# Patient Record
Sex: MALE | Race: WHITE | NOT HISPANIC OR LATINO
[De-identification: names, ages, dates, MRNs, and addresses within clinical notes are randomized per-mention and may not be internally consistent; named-entity substitution may affect disease eponyms.]

---

## 2017-01-09 ENCOUNTER — MEDICATION RENEWAL (OUTPATIENT)
Age: 22
End: 2017-01-09

## 2017-02-02 ENCOUNTER — RX RENEWAL (OUTPATIENT)
Age: 22
End: 2017-02-02

## 2017-02-27 ENCOUNTER — APPOINTMENT (OUTPATIENT)
Dept: NEUROLOGY | Facility: HOSPITAL | Age: 22
End: 2017-02-27

## 2017-03-03 ENCOUNTER — MEDICATION RENEWAL (OUTPATIENT)
Age: 22
End: 2017-03-03

## 2017-03-06 ENCOUNTER — RX RENEWAL (OUTPATIENT)
Age: 22
End: 2017-03-06

## 2017-04-24 ENCOUNTER — MESSAGE (OUTPATIENT)
Age: 22
End: 2017-04-24

## 2017-04-25 ENCOUNTER — RX RENEWAL (OUTPATIENT)
Age: 22
End: 2017-04-25

## 2017-05-01 ENCOUNTER — MEDICATION RENEWAL (OUTPATIENT)
Age: 22
End: 2017-05-01

## 2017-05-08 ENCOUNTER — OUTPATIENT (OUTPATIENT)
Dept: OUTPATIENT SERVICES | Facility: HOSPITAL | Age: 22
LOS: 1 days | End: 2017-05-08

## 2017-05-08 ENCOUNTER — APPOINTMENT (OUTPATIENT)
Dept: NEUROLOGY | Facility: HOSPITAL | Age: 22
End: 2017-05-08

## 2017-05-08 VITALS — HEART RATE: 98 BPM | HEIGHT: 63 IN | SYSTOLIC BLOOD PRESSURE: 120 MMHG | DIASTOLIC BLOOD PRESSURE: 78 MMHG

## 2017-05-08 DIAGNOSIS — G40.909 EPILEPSY, UNSPECIFIED, NOT INTRACTABLE, WITHOUT STATUS EPILEPTICUS: ICD-10-CM

## 2017-07-07 ENCOUNTER — APPOINTMENT (OUTPATIENT)
Dept: PEDIATRIC ADOLESCENT MEDICINE | Facility: HOSPITAL | Age: 22
End: 2017-07-07

## 2017-07-10 ENCOUNTER — APPOINTMENT (OUTPATIENT)
Dept: PEDIATRIC ADOLESCENT MEDICINE | Facility: HOSPITAL | Age: 22
End: 2017-07-10

## 2017-07-19 ENCOUNTER — APPOINTMENT (OUTPATIENT)
Dept: PEDIATRIC ADOLESCENT MEDICINE | Facility: HOSPITAL | Age: 22
End: 2017-07-19

## 2017-07-24 ENCOUNTER — APPOINTMENT (OUTPATIENT)
Dept: PEDIATRIC ADOLESCENT MEDICINE | Facility: HOSPITAL | Age: 22
End: 2017-07-24

## 2017-07-24 VITALS — DIASTOLIC BLOOD PRESSURE: 56 MMHG | HEART RATE: 92 BPM | SYSTOLIC BLOOD PRESSURE: 116 MMHG

## 2017-07-24 DIAGNOSIS — G80.9 CEREBRAL PALSY, UNSPECIFIED: ICD-10-CM

## 2017-08-02 ENCOUNTER — APPOINTMENT (OUTPATIENT)
Dept: PEDIATRIC ADOLESCENT MEDICINE | Facility: HOSPITAL | Age: 22
End: 2017-08-02

## 2017-08-16 ENCOUNTER — RX RENEWAL (OUTPATIENT)
Age: 22
End: 2017-08-16

## 2017-09-11 ENCOUNTER — MEDICATION RENEWAL (OUTPATIENT)
Age: 22
End: 2017-09-11

## 2017-10-18 ENCOUNTER — CHART COPY (OUTPATIENT)
Age: 22
End: 2017-10-18

## 2017-10-31 ENCOUNTER — MEDICATION RENEWAL (OUTPATIENT)
Age: 22
End: 2017-10-31

## 2017-11-03 ENCOUNTER — MEDICATION RENEWAL (OUTPATIENT)
Age: 22
End: 2017-11-03

## 2017-11-13 ENCOUNTER — APPOINTMENT (OUTPATIENT)
Dept: NEUROLOGY | Facility: HOSPITAL | Age: 22
End: 2017-11-13

## 2017-11-13 ENCOUNTER — OUTPATIENT (OUTPATIENT)
Dept: OUTPATIENT SERVICES | Facility: HOSPITAL | Age: 22
LOS: 1 days | End: 2017-11-13

## 2017-11-13 VITALS
HEART RATE: 77 BPM | BODY MASS INDEX: 22.5 KG/M2 | HEIGHT: 63 IN | WEIGHT: 127 LBS | DIASTOLIC BLOOD PRESSURE: 91 MMHG | SYSTOLIC BLOOD PRESSURE: 140 MMHG

## 2017-11-15 DIAGNOSIS — G40.909 EPILEPSY, UNSPECIFIED, NOT INTRACTABLE, WITHOUT STATUS EPILEPTICUS: ICD-10-CM

## 2017-12-15 ENCOUNTER — TRANSCRIPTION ENCOUNTER (OUTPATIENT)
Age: 22
End: 2017-12-15

## 2017-12-15 ENCOUNTER — INPATIENT (INPATIENT)
Facility: HOSPITAL | Age: 22
LOS: 1 days | Discharge: HOME | DRG: 101 | End: 2017-12-17
Attending: PSYCHIATRY & NEUROLOGY | Admitting: PSYCHIATRY & NEUROLOGY
Payer: MEDICARE

## 2017-12-15 VITALS
RESPIRATION RATE: 18 BRPM | HEART RATE: 53 BPM | WEIGHT: 275.58 LBS | SYSTOLIC BLOOD PRESSURE: 109 MMHG | OXYGEN SATURATION: 97 % | HEIGHT: 60.2 IN | TEMPERATURE: 98 F | DIASTOLIC BLOOD PRESSURE: 85 MMHG

## 2017-12-15 DIAGNOSIS — S73.005D UNSPECIFIED DISLOCATION OF LEFT HIP, SUBSEQUENT ENCOUNTER: Chronic | ICD-10-CM

## 2017-12-15 DIAGNOSIS — G40.209 LOCALIZATION-RELATED (FOCAL) (PARTIAL) SYMPTOMATIC EPILEPSY AND EPILEPTIC SYNDROMES WITH COMPLEX PARTIAL SEIZURES, NOT INTRACTABLE, WITHOUT STATUS EPILEPTICUS: ICD-10-CM

## 2017-12-15 DIAGNOSIS — M41.129 ADOLESCENT IDIOPATHIC SCOLIOSIS, SITE UNSPECIFIED: Chronic | ICD-10-CM

## 2017-12-15 LAB
ALBUMIN SERPL ELPH-MCNC: 4.6 G/DL — SIGNIFICANT CHANGE UP (ref 3.3–5)
ALP SERPL-CCNC: 88 U/L — SIGNIFICANT CHANGE UP (ref 40–120)
ALT FLD-CCNC: 19 U/L — SIGNIFICANT CHANGE UP (ref 10–45)
ANION GAP SERPL CALC-SCNC: 13 MMOL/L — SIGNIFICANT CHANGE UP (ref 5–17)
AST SERPL-CCNC: 18 U/L — SIGNIFICANT CHANGE UP (ref 10–40)
BILIRUB SERPL-MCNC: 0.5 MG/DL — SIGNIFICANT CHANGE UP (ref 0.2–1.2)
BUN SERPL-MCNC: 14 MG/DL — SIGNIFICANT CHANGE UP (ref 7–23)
CALCIUM SERPL-MCNC: 9.4 MG/DL — SIGNIFICANT CHANGE UP (ref 8.4–10.5)
CHLORIDE SERPL-SCNC: 99 MMOL/L — SIGNIFICANT CHANGE UP (ref 96–108)
CO2 SERPL-SCNC: 32 MMOL/L — HIGH (ref 22–31)
CREAT SERPL-MCNC: 0.63 MG/DL — SIGNIFICANT CHANGE UP (ref 0.5–1.3)
GLUCOSE SERPL-MCNC: 71 MG/DL — SIGNIFICANT CHANGE UP (ref 70–99)
INR BLD: 1.09 RATIO — SIGNIFICANT CHANGE UP (ref 0.88–1.16)
MAGNESIUM SERPL-MCNC: 2.5 MG/DL — SIGNIFICANT CHANGE UP (ref 1.6–2.6)
POTASSIUM SERPL-MCNC: 4.4 MMOL/L — SIGNIFICANT CHANGE UP (ref 3.5–5.3)
POTASSIUM SERPL-SCNC: 4.4 MMOL/L — SIGNIFICANT CHANGE UP (ref 3.5–5.3)
PROT SERPL-MCNC: 7.9 G/DL — SIGNIFICANT CHANGE UP (ref 6–8.3)
PROTHROM AB SERPL-ACNC: 12.3 SEC — SIGNIFICANT CHANGE UP (ref 10–13.1)
SODIUM SERPL-SCNC: 144 MMOL/L — SIGNIFICANT CHANGE UP (ref 135–145)

## 2017-12-15 PROCEDURE — 95819 EEG AWAKE AND ASLEEP: CPT | Mod: 26

## 2017-12-15 PROCEDURE — 99222 1ST HOSP IP/OBS MODERATE 55: CPT

## 2017-12-15 RX ORDER — ENOXAPARIN SODIUM 100 MG/ML
40 INJECTION SUBCUTANEOUS EVERY 24 HOURS
Qty: 0 | Refills: 0 | Status: DISCONTINUED | OUTPATIENT
Start: 2017-12-15 | End: 2017-12-17

## 2017-12-15 RX ORDER — LEVETIRACETAM 250 MG/1
1 TABLET, FILM COATED ORAL
Qty: 0 | Refills: 0 | COMMUNITY

## 2017-12-15 RX ORDER — LEVETIRACETAM 250 MG/1
500 TABLET, FILM COATED ORAL EVERY 24 HOURS
Qty: 0 | Refills: 0 | Status: DISCONTINUED | OUTPATIENT
Start: 2017-12-15 | End: 2017-12-17

## 2017-12-15 RX ORDER — FAMOTIDINE 10 MG/ML
20 INJECTION INTRAVENOUS
Qty: 0 | Refills: 0 | Status: DISCONTINUED | OUTPATIENT
Start: 2017-12-15 | End: 2017-12-17

## 2017-12-15 RX ORDER — LAMOTRIGINE 25 MG/1
200 TABLET, ORALLY DISINTEGRATING ORAL
Qty: 0 | Refills: 0 | Status: DISCONTINUED | OUTPATIENT
Start: 2017-12-15 | End: 2017-12-17

## 2017-12-15 RX ORDER — LEVETIRACETAM 250 MG/1
1000 TABLET, FILM COATED ORAL
Qty: 0 | Refills: 0 | Status: DISCONTINUED | OUTPATIENT
Start: 2017-12-15 | End: 2017-12-17

## 2017-12-15 RX ADMIN — ENOXAPARIN SODIUM 40 MILLIGRAM(S): 100 INJECTION SUBCUTANEOUS at 20:39

## 2017-12-15 RX ADMIN — LEVETIRACETAM 500 MILLIGRAM(S): 250 TABLET, FILM COATED ORAL at 17:34

## 2017-12-15 RX ADMIN — FAMOTIDINE 20 MILLIGRAM(S): 10 INJECTION INTRAVENOUS at 20:40

## 2017-12-15 RX ADMIN — LEVETIRACETAM 1000 MILLIGRAM(S): 250 TABLET, FILM COATED ORAL at 17:33

## 2017-12-15 RX ADMIN — LAMOTRIGINE 200 MILLIGRAM(S): 25 TABLET, ORALLY DISINTEGRATING ORAL at 17:30

## 2017-12-15 NOTE — DISCHARGE NOTE ADULT - PATIENT PORTAL LINK FT
“You can access the [facility] Patient Portal, offered by [facility], by registering with the following website: [email or web address]”

## 2017-12-15 NOTE — H&P ADULT - MENTAL STATUS
General: Sitting in wheelchair in no acute distress  Neuro: MS: Awake and alert, non-verbal, not following simple commands

## 2017-12-15 NOTE — H&P ADULT - MOTOR
Motor: Increased tone throughout. b/l UE's antigravity, internally rotated/contracted. does not move LE  Reflexes: 3+ DTR's throughout

## 2017-12-15 NOTE — DISCHARGE NOTE ADULT - MEDICATION SUMMARY - MEDICATIONS TO TAKE
I will START or STAY ON the medications listed below when I get home from the hospital:    LaMICtal 200 mg oral tablet  -- 1 tab(s) by mouth 2 times a day  -- Indication: For Seizure disorder    Keppra 1000 mg oral tablet  -- 1 tab(s) by mouth in AM and   one and half tablet in evening   -- Indication: For Seizure disorder    raNITIdine 75 mg oral tablet  -- take 1/2 tab in the morning and 1 1/2 tablets in the evening.  -- Indication: For GERD

## 2017-12-15 NOTE — DISCHARGE NOTE ADULT - CARE PLAN
Principal Discharge DX:	Seizure disorder  Goal:	follow with   Instructions for follow-up, activity and diet:	take prescribe medications as advised Principal Discharge DX:	Seizure disorder  Goal:	follow with Dr. Moreno  Instructions for follow-up, activity and diet:	take prescribe medications as advised Principal Discharge DX:	Seizure disorder  Goal:	follow with Dr. Moreno  Assessment and plan of treatment:	take prescribe medications as advised

## 2017-12-15 NOTE — DISCHARGE NOTE ADULT - NS AS DC STROKE ED MATERIALS
Call 911 for Stroke/Prescribed Medications/Stroke Warning Signs and Symptoms/Need for Followup After Discharge/Risk Factors for Stroke/Stroke Education Booklet

## 2017-12-15 NOTE — H&P ADULT - ASSESSMENT
19 yo M with Fragile X syndrome, MR, and epilepsy has twitching episodes every 6- 8 months, lest episode 4 months ago. On Keppra 1000/1500mg and Lamictal 200BID.        -Patient to have EMU admission to monitor for subclinical seizures  -Continue meds as is lamictal 200mg BID and Keppra 1000/1500mg  - regular diet   - DVT prophylaxis   - VEEG

## 2017-12-15 NOTE — H&P ADULT - HISTORY OF PRESENT ILLNESS
Pt is a 21 yo M with hx of Fragile X syndrome, MR and seizures since 3 years old. He has been on Keppra and Lamictal since about 8 years ago with great control of the seizures currently about once a year. He is currently on (generic medications) Keppra 1000 mg tab 2 po AM and 1.5 in the PM and Lamictal 200 mg tab, 1 po BID. The seizures typically consist of eyes rolled up and had shaking with generalized body shaking and stiffening. The events typically last about 1 minute and they give him rectal Diastat which stops the seizures promptly.     Mother reports that patient has twitching episodes once every 6-8 months. Last episode was about 4 months ago where he had facial twitching for a few minutes and was given diastat, no seizures after that. They would like an EMU admission to see if he is having subclinical seizures. No adverse effects from the medication regimen. Continues to take Keppra 1000/1500mg and Lamictal 200mg BID. Pt is a 21 yo M with hx of Fragile X syndrome, MR and seizures since 3 years old. He has been on Keppra and Lamictal since about 8 years ago with great control of the seizures currently about once a year. He is currently on (generic medications) Keppra 1000 mg tab 2 po AM and 1.5 in the PM and Lamictal 200 mg tab, 1 po BID. The seizures typically consist of eyes rolled up and had shaking with generalized body shaking and stiffening. The events typically last about 1 minute and they give him rectal Diastat which stops the seizures promptly.     Mother reports that patient has twitching episodes once every 6-8 months. Last GTC 8 months ago. Facial twitching every 5 weeks or so, unclear if other seizure type.  No adverse effects from the medication regimen. Continues to take Keppra 1000/1500mg and Lamictal 200mg BID.

## 2017-12-15 NOTE — H&P ADULT - ATTENDING COMMENTS
I have examined the pt at bedside and discussed the plan with patient's father at bedside, resident.  The risks and the benefits of the proposed diagnostic/therapeutic approach were thoroughly discussed.   I agree with the above plan and have modified it where necessary.

## 2017-12-15 NOTE — DISCHARGE NOTE ADULT - HOSPITAL COURSE
Pt is a 21 yo M with hx of Fragile X syndrome, MR and seizures since 3 years old. He has been on Keppra and Lamictal since about 8 years ago with great control of the seizures currently about once a year. He is currently on (generic medications) Keppra 1000 mg tab 2 po AM and 1.5 in the PM and Lamictal 200 mg tab, 1 po BID. The seizures typically consist of eyes rolled up and had shaking with generalized body shaking and stiffening. The events typically last about 1 minute and they give him rectal Diastat which stops the seizures promptly.     Mother reports that patient has twitching episodes once every 6-8 months. Last GTC 8 months ago. Facial twitching every 5 weeks or so, unclear if other seizure type.  No adverse effects from the medication regimen. Continues to take Keppra 1000/1500mg and Lamictal 200mg BID. Pt is a 21 yo M with hx of Fragile X syndrome, MR and seizures since 3 years old. He has been on Keppra and Lamictal since about 8 years ago with great control of the seizures currently about once a year. He is currently on (generic medications) Keppra 1000 mg tab 2 po AM and 1.5 in the PM and Lamictal 200 mg tab, 1 po BID. The seizures typically consist of eyes rolled up and had shaking with generalized body shaking and stiffening. The events typically last about 1 minute and they give him rectal Diastat which stops the seizures promptly.     Mother reports that patient has twitching episodes once every 6-8 months. Last GTC 8 months ago. Facial twitching every 5 weeks or so, unclear if other seizure type.  No adverse effects from the medication regimen. Continues to take Keppra 1000/1500mg and Lamictal 200mg BID.    Patient admitted to EMU for monitoring. No events on inpatient 48 hour VEEG. Patient cleared for discharge will continue on home AEDs Pt is a 21 yo M with hx of Fragile X syndrome, MR and seizures since 3 years old. He has been on Keppra and Lamictal since about 8 years ago with great control of the seizures currently about once a year. He is currently on (generic medications) Keppra 1000 mg tab 2 po AM and 1.5 in the PM and Lamictal 200 mg tab, 1 po BID. The seizures typically consist of eyes rolled up and had shaking with generalized body shaking and stiffening. The events typically last about 1 minute and they give him rectal Diastat which stops the seizures promptly.     Mother reports that patient has twitching episodes once every 6-8 months. Last GTC 8 months ago. Facial twitching every 5 weeks or so, unclear if other seizure type.  No adverse effects from the medication regimen. Continues to take Keppra 1000/1500mg and Lamictal 200mg BID.    Patient admitted to EMU for monitoring. No events on inpatient 48 hour VEEG.  Plan discussed with Dr. Trevizo on day of discharge.  -Patient to be discharged with Follow up with Dr. Moreno  -Continue home meds Lamictal 200mg BID and Keppra 1000/1500mg upon DC.  -Patient cleared for discharge will continue on home AEDs.

## 2017-12-15 NOTE — DISCHARGE NOTE ADULT - CARE PROVIDER_API CALL
Skip Moreno (MD), Clinical Neurophysiology; EEGEpilepsy; Neurology  [address] [address]  Phone: [phone]  Fax: [phone]

## 2017-12-16 PROCEDURE — 95951: CPT | Mod: 26

## 2017-12-16 PROCEDURE — 99232 SBSQ HOSP IP/OBS MODERATE 35: CPT

## 2017-12-16 RX ADMIN — LEVETIRACETAM 500 MILLIGRAM(S): 250 TABLET, FILM COATED ORAL at 17:36

## 2017-12-16 RX ADMIN — ENOXAPARIN SODIUM 40 MILLIGRAM(S): 100 INJECTION SUBCUTANEOUS at 20:20

## 2017-12-16 RX ADMIN — LAMOTRIGINE 200 MILLIGRAM(S): 25 TABLET, ORALLY DISINTEGRATING ORAL at 05:44

## 2017-12-16 RX ADMIN — FAMOTIDINE 20 MILLIGRAM(S): 10 INJECTION INTRAVENOUS at 10:26

## 2017-12-16 RX ADMIN — FAMOTIDINE 20 MILLIGRAM(S): 10 INJECTION INTRAVENOUS at 17:34

## 2017-12-16 RX ADMIN — LEVETIRACETAM 1000 MILLIGRAM(S): 250 TABLET, FILM COATED ORAL at 05:44

## 2017-12-16 RX ADMIN — LAMOTRIGINE 200 MILLIGRAM(S): 25 TABLET, ORALLY DISINTEGRATING ORAL at 17:34

## 2017-12-16 NOTE — PROGRESS NOTE ADULT - ASSESSMENT
19 yo M with Fragile X syndrome, MR, and epilepsy has twitching episodes every 6- 8 months, lest episode 4 months ago. On Keppra 1000/1500mg and Lamictal 200BID. No events capture on EEG overnight.         -Patient to have EMU admission to monitor for subclinical seizures  -Continue meds as is lamictal 200mg BID and Keppra 1000/1500mg  - regular diet   - DVT prophylaxis   - continuous VEEG   - seizure and fall precautions.  - Ativan 1mg ordered PRN for seizure >3 minutes.       - If no events overnight , plan for discharge tomorrow 21 yo M with Fragile X syndrome, MR, and epilepsy has twitching episodes every 6- 8 months, lest episode 4 months ago. On Keppra 1000/1500mg and Lamictal 200BID. No events capture on EEG overnight.     -Patient to have EMU admission to monitor for subclinical seizures  -Continue meds as is lamictal 200mg BID and Keppra 1000/1500mg  - regular diet   - DVT prophylaxis   - continuous VEEG   - seizure and fall precautions.  - Ativan 1mg ordered PRN for seizure >3 minutes.       - If no events overnight , plan for discharge tomorrow

## 2017-12-16 NOTE — PROGRESS NOTE ADULT - SUBJECTIVE AND OBJECTIVE BOX
Neurology Follow up note    Patient is a 22y old  Male who presents with a chief complaint of facial twitching (15 Dec 2017 16:16)      Subjective:Interval History - No events overnight    CONSTITUTIONAL:   HEENT:  No visual loss, blurred vision, double vision.  No hearing loss, sneezing, congestion, runny nose or sore throat.  SKIN:  No rash or itching.  CARDIOVASCULAR:  No chest pain, chest pressure or chest discomfort. No palpitations or edema.  RESPIRATORY:  No shortness of breath, cough or sputum.  GASTROINTESTINAL:  No anorexia, nausea, vomiting or diarrhea. No abdominal pain.  GENITOURINARY:  NO dysuria. No increased frequency. No retention. No incontinence.  NEUROLOGICAL:  See HPI  MUSCULOSKELETAL:  No muscle, back pain, joint pain or stiffness.  HEMATOLOGIC:  No anemia, bleeding or bruising.    PAST MEDICAL & SURGICAL HISTORY:  Seizure disorder  Fragile X syndrome  Cerebral palsy  Bilateral hip dislocation, subsequent encounter  Adolescent idiopathic scoliosis      Objective:   Vital Signs Last 24 Hrs  T(C): 36.5 (16 Dec 2017 12:31), Max: 37.1 (15 Dec 2017 20:01)  T(F): 97.7 (16 Dec 2017 12:31), Max: 98.7 (15 Dec 2017 20:01)  HR: 74 (16 Dec 2017 12:31) (61 - 74)  BP: 97/67 (16 Dec 2017 12:31) (84/52 - 111/64)  BP(mean): --  RR: 18 (16 Dec 2017 12:31) (18 - 18)  SpO2: 98% (16 Dec 2017 12:31) (96% - 100%)    General Exam:   General appearance: No acute distress                   Neurological exam:  MS: Awake alert, nonverbal at baseline, not following commands  CN: PERRL, EOMI, no facial asymmetry, tongue midline  Motor: increased tone throughout, no movement LEs, contracted.  Sensory: Intact to LY throughout  Reflexes: hyperreflexic throughout      Other:    12-15    144  |  99  |  14  ----------------------------<  71  4.4   |  32<H>  |  0.63    Ca    9.4      15 Dec 2017 16:47  Mg     2.5     12-15    TPro  7.9  /  Alb  4.6  /  TBili  0.5  /  DBili  x   /  AST  18  /  ALT  19  /  AlkPhos  88  12-15    12-15    144  |  99  |  14  ----------------------------<  71  4.4   |  32<H>  |  0.63    Ca    9.4      15 Dec 2017 16:47  Mg     2.5     12-15    TPro  7.9  /  Alb  4.6  /  TBili  0.5  /  DBili  x   /  AST  18  /  ALT  19  /  AlkPhos  88  12-15    LIVER FUNCTIONS - ( 15 Dec 2017 16:47 )  Alb: 4.6 g/dL / Pro: 7.9 g/dL / ALK PHOS: 88 U/L / ALT: 19 U/L / AST: 18 U/L / GGT: x            MEDICATIONS  (STANDING):  enoxaparin Injectable 40 milliGRAM(s) SubCutaneous every 24 hours  famotidine    Tablet 20 milliGRAM(s) Oral two times a day  lamoTRIgine 200 milliGRAM(s) Oral two times a day  levETIRAcetam 500 milliGRAM(s) Oral every 24 hours  levETIRAcetam 1000 milliGRAM(s) Oral two times a day  LORazepam   Injectable 1 milliGRAM(s) IV Push once    MEDICATIONS  (PRN):  LORazepam   Injectable 2 milliGRAM(s) IV Push once PRN Seizure activity Neurology Follow up note    Patient is a 22y old  Male who presents with a chief complaint of facial twitching (15 Dec 2017 16:16)    Subjective:Interval History - No events overnight    ROS:  HEENT:  No visual loss, blurred vision, double vision.  No hearing loss, sneezing, congestion, runny nose or sore throat.  SKIN:  No rash or itching.  CARDIOVASCULAR:  No chest pain, chest pressure or chest discomfort. No palpitations or edema.  RESPIRATORY:  No shortness of breath, cough or sputum.  GASTROINTESTINAL:  No anorexia, nausea, vomiting or diarrhea. No abdominal pain.  GENITOURINARY:  NO dysuria. No increased frequency. No retention. No incontinence.  NEUROLOGICAL:  See HPI  MUSCULOSKELETAL:  No muscle, back pain, joint pain or stiffness.  HEMATOLOGIC:  No anemia, bleeding or bruising.    PAST MEDICAL & SURGICAL HISTORY:  Seizure disorder  Fragile X syndrome  Cerebral palsy  Bilateral hip dislocation, subsequent encounter  Adolescent idiopathic scoliosis    Objective:   Vital Signs Last 24 Hrs  T(C): 36.5 (16 Dec 2017 12:31), Max: 37.1 (15 Dec 2017 20:01)  T(F): 97.7 (16 Dec 2017 12:31), Max: 98.7 (15 Dec 2017 20:01)  HR: 74 (16 Dec 2017 12:31) (61 - 74)  BP: 97/67 (16 Dec 2017 12:31) (84/52 - 111/64)  BP(mean): --  RR: 18 (16 Dec 2017 12:31) (18 - 18)  SpO2: 98% (16 Dec 2017 12:31) (96% - 100%)    General Exam:   General appearance: No acute distress                   Neurological exam:  MS: Awake alert, nonverbal at baseline, not following commands  CN: PERRL, EOMI, no facial asymmetry, tongue midline  Motor: increased tone throughout, no movement LEs, contracted.  Sensory: Intact to LT throughout  Reflexes: hyperreflexic throughout    Other:    12-15    144  |  99  |  14  ----------------------------<  71  4.4   |  32<H>  |  0.63    Ca    9.4      15 Dec 2017 16:47  Mg     2.5     12-15    TPro  7.9  /  Alb  4.6  /  TBili  0.5  /  DBili  x   /  AST  18  /  ALT  19  /  AlkPhos  88  12-15    12-15    144  |  99  |  14  ----------------------------<  71  4.4   |  32<H>  |  0.63    Ca    9.4      15 Dec 2017 16:47  Mg     2.5     12-15    TPro  7.9  /  Alb  4.6  /  TBili  0.5  /  DBili  x   /  AST  18  /  ALT  19  /  AlkPhos  88  12-15    LIVER FUNCTIONS - ( 15 Dec 2017 16:47 )  Alb: 4.6 g/dL / Pro: 7.9 g/dL / ALK PHOS: 88 U/L / ALT: 19 U/L / AST: 18 U/L / GGT: x          MEDICATIONS  (STANDING):  enoxaparin Injectable 40 milliGRAM(s) SubCutaneous every 24 hours  famotidine    Tablet 20 milliGRAM(s) Oral two times a day  lamoTRIgine 200 milliGRAM(s) Oral two times a day  levETIRAcetam 500 milliGRAM(s) Oral every 24 hours  levETIRAcetam 1000 milliGRAM(s) Oral two times a day  LORazepam   Injectable 1 milliGRAM(s) IV Push once    MEDICATIONS  (PRN):  LORazepam   Injectable 2 milliGRAM(s) IV Push once PRN Seizure activity

## 2017-12-17 VITALS
TEMPERATURE: 98 F | OXYGEN SATURATION: 100 % | SYSTOLIC BLOOD PRESSURE: 102 MMHG | HEART RATE: 77 BPM | RESPIRATION RATE: 18 BRPM | DIASTOLIC BLOOD PRESSURE: 75 MMHG

## 2017-12-17 LAB
HCT VFR BLD CALC: 44.8 % — SIGNIFICANT CHANGE UP (ref 39–50)
HGB BLD-MCNC: 15 G/DL — SIGNIFICANT CHANGE UP (ref 13–17)
LAMOTRIGINE SERPL-MCNC: 16.8 MCG/ML — SIGNIFICANT CHANGE UP (ref 2.5–15)
MCHC RBC-ENTMCNC: 32.6 PG — SIGNIFICANT CHANGE UP (ref 27–34)
MCHC RBC-ENTMCNC: 33.5 GM/DL — SIGNIFICANT CHANGE UP (ref 32–36)
MCV RBC AUTO: 97.4 FL — SIGNIFICANT CHANGE UP (ref 80–100)
PLATELET # BLD AUTO: 188 K/UL — SIGNIFICANT CHANGE UP (ref 150–400)
RBC # BLD: 4.6 M/UL — SIGNIFICANT CHANGE UP (ref 4.2–5.8)
RBC # FLD: 12.5 % — SIGNIFICANT CHANGE UP (ref 10.3–14.5)
WBC # BLD: 8.47 K/UL — SIGNIFICANT CHANGE UP (ref 3.8–10.5)
WBC # FLD AUTO: 8.47 K/UL — SIGNIFICANT CHANGE UP (ref 3.8–10.5)

## 2017-12-17 PROCEDURE — 85027 COMPLETE CBC AUTOMATED: CPT

## 2017-12-17 PROCEDURE — 85610 PROTHROMBIN TIME: CPT

## 2017-12-17 PROCEDURE — 80175 DRUG SCREEN QUAN LAMOTRIGINE: CPT

## 2017-12-17 PROCEDURE — 99232 SBSQ HOSP IP/OBS MODERATE 35: CPT

## 2017-12-17 PROCEDURE — 83735 ASSAY OF MAGNESIUM: CPT

## 2017-12-17 PROCEDURE — 95951: CPT

## 2017-12-17 PROCEDURE — 95819 EEG AWAKE AND ASLEEP: CPT

## 2017-12-17 PROCEDURE — 80053 COMPREHEN METABOLIC PANEL: CPT

## 2017-12-17 PROCEDURE — 95951: CPT | Mod: 26

## 2017-12-17 PROCEDURE — 90686 IIV4 VACC NO PRSV 0.5 ML IM: CPT

## 2017-12-17 RX ORDER — INFLUENZA VIRUS VACCINE 15; 15; 15; 15 UG/.5ML; UG/.5ML; UG/.5ML; UG/.5ML
0.5 SUSPENSION INTRAMUSCULAR ONCE
Qty: 0 | Refills: 0 | Status: COMPLETED | OUTPATIENT
Start: 2017-12-17 | End: 2017-12-17

## 2017-12-17 RX ADMIN — FAMOTIDINE 20 MILLIGRAM(S): 10 INJECTION INTRAVENOUS at 07:34

## 2017-12-17 RX ADMIN — INFLUENZA VIRUS VACCINE 0.5 MILLILITER(S): 15; 15; 15; 15 SUSPENSION INTRAMUSCULAR at 12:38

## 2017-12-17 RX ADMIN — LAMOTRIGINE 200 MILLIGRAM(S): 25 TABLET, ORALLY DISINTEGRATING ORAL at 07:34

## 2017-12-17 RX ADMIN — LEVETIRACETAM 1000 MILLIGRAM(S): 250 TABLET, FILM COATED ORAL at 07:34

## 2017-12-17 NOTE — PROGRESS NOTE ADULT - SUBJECTIVE AND OBJECTIVE BOX
Neurology Follow up note    Patient is a 22y old  Male who presents with a chief complaint of facial twitching (15 Dec 2017 16:16)    Subjective:Interval History - No events overnight EEG negative    ROS:  HEENT:  No visual loss, blurred vision, double vision.  No hearing loss, sneezing, congestion, runny nose or sore throat.  SKIN:  No rash or itching.  CARDIOVASCULAR:  No chest pain, chest pressure or chest discomfort. No palpitations or edema.  RESPIRATORY:  No shortness of breath, cough or sputum.  GASTROINTESTINAL:  No anorexia, nausea, vomiting or diarrhea. No abdominal pain.  GENITOURINARY:  NO dysuria. No increased frequency. No retention. No incontinence.  NEUROLOGICAL:  See HPI  MUSCULOSKELETAL:  No muscle, back pain, joint pain or stiffness.  HEMATOLOGIC:  No anemia, bleeding or bruising.    PAST MEDICAL & SURGICAL HISTORY:  Seizure disorder  Fragile X syndrome  Cerebral palsy  Bilateral hip dislocation, subsequent encounter  Adolescent idiopathic scoliosis    Objective:   Vital Signs Last 24 Hrs  T(C): 36.4 (17 Dec 2017 08:14), Max: 36.6 (16 Dec 2017 15:31)  T(F): 97.5 (17 Dec 2017 08:14), Max: 97.8 (16 Dec 2017 15:31)  HR: 82 (17 Dec 2017 08:14) (60 - 82)  BP: 104/67 (17 Dec 2017 08:14) (95/61 - 109/77)  BP(mean): --  RR: 18 (17 Dec 2017 08:14) (16 - 19)  SpO2: 99% (17 Dec 2017 08:14) (97% - 100%)  General Exam:   General appearance: No acute distress                   Neurological exam:  MS: Awake alert, nonverbal at baseline, not following commands  CN: PERRL, EOMI, no facial asymmetry, tongue midline  Motor: increased tone throughout, no movement LEs, contracted.  Sensory: Intact to LT throughout  Reflexes: hyperreflexic throughout    Other:    12-15    144  |  99  |  14  ----------------------------<  71  4.4   |  32<H>  |  0.63    Ca    9.4      15 Dec 2017 16:47  Mg     2.5     12-15    TPro  7.9  /  Alb  4.6  /  TBili  0.5  /  DBili  x   /  AST  18  /  ALT  19  /  AlkPhos  88  12-15    12-15    144  |  99  |  14  ----------------------------<  71  4.4   |  32<H>  |  0.63    Ca    9.4      15 Dec 2017 16:47  Mg     2.5     12-15    TPro  7.9  /  Alb  4.6  /  TBili  0.5  /  DBili  x   /  AST  18  /  ALT  19  /  AlkPhos  88  12-15    LIVER FUNCTIONS - ( 15 Dec 2017 16:47 )  Alb: 4.6 g/dL / Pro: 7.9 g/dL / ALK PHOS: 88 U/L / ALT: 19 U/L / AST: 18 U/L / GGT: x          MEDICATIONS  (STANDING):  enoxaparin Injectable 40 milliGRAM(s) SubCutaneous every 24 hours  famotidine    Tablet 20 milliGRAM(s) Oral two times a day  lamoTRIgine 200 milliGRAM(s) Oral two times a day  levETIRAcetam 500 milliGRAM(s) Oral every 24 hours  levETIRAcetam 1000 milliGRAM(s) Oral two times a day  LORazepam   Injectable 1 milliGRAM(s) IV Push once    MEDICATIONS  (PRN):  LORazepam   Injectable 2 milliGRAM(s) IV Push once PRN Seizure activity Neurology Follow up note    Patient is a 22y old  Male who presents with a chief complaint of facial twitching (15 Dec 2017 16:16).    Subjective: Interval History - No events overnight EEG negative    ROS:  HEENT:  No visual loss, blurred vision, double vision.  No hearing loss, sneezing, congestion, runny nose or sore throat.  SKIN:  No rash or itching.  CARDIOVASCULAR:  No chest pain, chest pressure or chest discomfort. No palpitations or edema.  RESPIRATORY:  No shortness of breath, cough or sputum.  GASTROINTESTINAL:  No anorexia, nausea, vomiting or diarrhea. No abdominal pain.  GENITOURINARY:  NO dysuria. No increased frequency. No retention. No incontinence.  NEUROLOGICAL:  See HPI  MUSCULOSKELETAL:  No muscle, back pain, joint pain or stiffness.  HEMATOLOGIC:  No anemia, bleeding or bruising.    PAST MEDICAL & SURGICAL HISTORY:  Seizure disorder  Fragile X syndrome  Cerebral palsy  Bilateral hip dislocation, subsequent encounter  Adolescent idiopathic scoliosis    Allergies  No Known Allergies    Social Hx: lives with family, no toxic habits.     Objective:   Vital Signs Last 24 Hrs  T(C): 36.4 (17 Dec 2017 08:14), Max: 36.6 (16 Dec 2017 15:31)  T(F): 97.5 (17 Dec 2017 08:14), Max: 97.8 (16 Dec 2017 15:31)  HR: 82 (17 Dec 2017 08:14) (60 - 82)  BP: 104/67 (17 Dec 2017 08:14) (95/61 - 109/77)  BP(mean): --  RR: 18 (17 Dec 2017 08:14) (16 - 19)  SpO2: 99% (17 Dec 2017 08:14) (97% - 100%)    General Exam:   General appearance: No acute distress  Skin: no rashes.   CVS: S1, S2, no MRG.   Lungs: CTA.                   Neurological exam:  MS: Awake alert, nonverbal at baseline, not following commands  CN: PERRL, EOMI, no facial asymmetry, tongue midline  Motor: increased tone throughout, no movement LEs, contracted.  Sensory: Intact to LT throughout  Reflexes: hyperreflexic throughout    Other:    12-15    144  |  99  |  14  ----------------------------<  71  4.4   |  32<H>  |  0.63    Ca    9.4      15 Dec 2017 16:47  Mg     2.5     12-15    TPro  7.9  /  Alb  4.6  /  TBili  0.5  /  DBili  x   /  AST  18  /  ALT  19  /  AlkPhos  88  12-15    12-15    144  |  99  |  14  ----------------------------<  71  4.4   |  32<H>  |  0.63    Ca    9.4      15 Dec 2017 16:47  Mg     2.5     12-15    TPro  7.9  /  Alb  4.6  /  TBili  0.5  /  DBili  x   /  AST  18  /  ALT  19  /  AlkPhos  88  12-15    LIVER FUNCTIONS - ( 15 Dec 2017 16:47 )  Alb: 4.6 g/dL / Pro: 7.9 g/dL / ALK PHOS: 88 U/L / ALT: 19 U/L / AST: 18 U/L / GGT: x          MEDICATIONS  (STANDING):  enoxaparin Injectable 40 milliGRAM(s) SubCutaneous every 24 hours  famotidine    Tablet 20 milliGRAM(s) Oral two times a day  lamoTRIgine 200 milliGRAM(s) Oral two times a day  levETIRAcetam 500 milliGRAM(s) Oral every 24 hours  levETIRAcetam 1000 milliGRAM(s) Oral two times a day  LORazepam   Injectable 1 milliGRAM(s) IV Push once    MEDICATIONS  (PRN):  LORazepam   Injectable 2 milliGRAM(s) IV Push once PRN Seizure activity

## 2017-12-17 NOTE — PROGRESS NOTE ADULT - ATTENDING COMMENTS
I have examined the pt at bedside and discussed the plan with neuro resident and Dr. Trevizo.  The risks and the benefits of the proposed diagnostic/therapeutic approach were thoroughly discussed.   I agree with the above plan and have modified it where necessary.

## 2017-12-17 NOTE — PROGRESS NOTE ADULT - ASSESSMENT
21 yo M with Fragile X syndrome, MR, and epilepsy has twitching episodes every 6- 8 months, lest episode 4 months ago. On Keppra 1000/1500mg and Lamictal 200BID. No events capture on EEG past 48 hours    -Patient to be discharged with Follow up with   -Continue home  meds lamictal 200mg BID and Keppra 1000/1500mg upon DC 19 yo M with Fragile X syndrome, MR, and epilepsy has twitching episodes every 6- 8 months, lest episode 4 months ago. On Keppra 1000/1500mg and Lamictal 200BID. No events capture on EEG past 48 hours    -Patient to be discharged with Follow up with Dr. Moreno  -Continue home meds lamictal 200mg BID and Keppra 1000/1500mg upon DC

## 2018-01-18 ENCOUNTER — RX RENEWAL (OUTPATIENT)
Age: 23
End: 2018-01-18

## 2018-03-13 ENCOUNTER — MEDICATION RENEWAL (OUTPATIENT)
Age: 23
End: 2018-03-13

## 2018-04-03 ENCOUNTER — MEDICATION RENEWAL (OUTPATIENT)
Age: 23
End: 2018-04-03

## 2018-05-14 ENCOUNTER — APPOINTMENT (OUTPATIENT)
Dept: NEUROLOGY | Facility: HOSPITAL | Age: 23
End: 2018-05-14

## 2018-06-01 ENCOUNTER — INPATIENT (INPATIENT)
Facility: HOSPITAL | Age: 23
LOS: 3 days | Discharge: HOME HEALTH | End: 2018-06-05
Attending: HOSPITALIST | Admitting: HOSPITALIST
Payer: MEDICARE

## 2018-06-01 VITALS
HEART RATE: 108 BPM | RESPIRATION RATE: 16 BRPM | DIASTOLIC BLOOD PRESSURE: 83 MMHG | TEMPERATURE: 99 F | SYSTOLIC BLOOD PRESSURE: 115 MMHG | OXYGEN SATURATION: 98 %

## 2018-06-01 DIAGNOSIS — S73.005D UNSPECIFIED DISLOCATION OF LEFT HIP, SUBSEQUENT ENCOUNTER: Chronic | ICD-10-CM

## 2018-06-01 DIAGNOSIS — M41.129 ADOLESCENT IDIOPATHIC SCOLIOSIS, SITE UNSPECIFIED: Chronic | ICD-10-CM

## 2018-06-01 LAB
ALBUMIN SERPL ELPH-MCNC: 4.8 G/DL — SIGNIFICANT CHANGE UP (ref 3.3–5)
ALP SERPL-CCNC: 76 U/L — SIGNIFICANT CHANGE UP (ref 40–120)
ALT FLD-CCNC: 13 U/L — SIGNIFICANT CHANGE UP (ref 4–41)
AST SERPL-CCNC: 14 U/L — SIGNIFICANT CHANGE UP (ref 4–40)
BASE EXCESS BLDV CALC-SCNC: 8.8 MMOL/L — SIGNIFICANT CHANGE UP
BASOPHILS # BLD AUTO: 0.04 K/UL — SIGNIFICANT CHANGE UP (ref 0–0.2)
BASOPHILS NFR BLD AUTO: 0.5 % — SIGNIFICANT CHANGE UP (ref 0–2)
BILIRUB SERPL-MCNC: 0.2 MG/DL — SIGNIFICANT CHANGE UP (ref 0.2–1.2)
BLOOD GAS VENOUS - CREATININE: 0.53 MG/DL — SIGNIFICANT CHANGE UP (ref 0.5–1.3)
BUN SERPL-MCNC: 13 MG/DL — SIGNIFICANT CHANGE UP (ref 7–23)
CALCIUM SERPL-MCNC: 9.2 MG/DL — SIGNIFICANT CHANGE UP (ref 8.4–10.5)
CHLORIDE BLDV-SCNC: 104 MMOL/L — SIGNIFICANT CHANGE UP (ref 96–108)
CHLORIDE SERPL-SCNC: 101 MMOL/L — SIGNIFICANT CHANGE UP (ref 98–107)
CO2 SERPL-SCNC: 34 MMOL/L — HIGH (ref 22–31)
CREAT SERPL-MCNC: 0.63 MG/DL — SIGNIFICANT CHANGE UP (ref 0.5–1.3)
EOSINOPHIL # BLD AUTO: 0.06 K/UL — SIGNIFICANT CHANGE UP (ref 0–0.5)
EOSINOPHIL NFR BLD AUTO: 0.7 % — SIGNIFICANT CHANGE UP (ref 0–6)
GAS PNL BLDV: 139 MMOL/L — SIGNIFICANT CHANGE UP (ref 136–146)
GLUCOSE BLDV-MCNC: 89 — SIGNIFICANT CHANGE UP (ref 70–99)
GLUCOSE SERPL-MCNC: 90 MG/DL — SIGNIFICANT CHANGE UP (ref 70–99)
HCO3 BLDV-SCNC: 28 MMOL/L — HIGH (ref 20–27)
HCT VFR BLD CALC: 47.1 % — SIGNIFICANT CHANGE UP (ref 39–50)
HCT VFR BLDV CALC: 49.4 % — SIGNIFICANT CHANGE UP (ref 39–51)
HGB BLD-MCNC: 15.1 G/DL — SIGNIFICANT CHANGE UP (ref 13–17)
HGB BLDV-MCNC: 16.1 G/DL — SIGNIFICANT CHANGE UP (ref 13–17)
IMM GRANULOCYTES # BLD AUTO: 0.03 # — SIGNIFICANT CHANGE UP
IMM GRANULOCYTES NFR BLD AUTO: 0.3 % — SIGNIFICANT CHANGE UP (ref 0–1.5)
LACTATE BLDV-MCNC: 1.1 MMOL/L — SIGNIFICANT CHANGE UP (ref 0.5–2)
LYMPHOCYTES # BLD AUTO: 2.01 K/UL — SIGNIFICANT CHANGE UP (ref 1–3.3)
LYMPHOCYTES # BLD AUTO: 23.3 % — SIGNIFICANT CHANGE UP (ref 13–44)
MCHC RBC-ENTMCNC: 31.1 PG — SIGNIFICANT CHANGE UP (ref 27–34)
MCHC RBC-ENTMCNC: 32.1 % — SIGNIFICANT CHANGE UP (ref 32–36)
MCV RBC AUTO: 96.9 FL — SIGNIFICANT CHANGE UP (ref 80–100)
MONOCYTES # BLD AUTO: 0.51 K/UL — SIGNIFICANT CHANGE UP (ref 0–0.9)
MONOCYTES NFR BLD AUTO: 5.9 % — SIGNIFICANT CHANGE UP (ref 2–14)
NEUTROPHILS # BLD AUTO: 5.98 K/UL — SIGNIFICANT CHANGE UP (ref 1.8–7.4)
NEUTROPHILS NFR BLD AUTO: 69.3 % — SIGNIFICANT CHANGE UP (ref 43–77)
NRBC # FLD: 0 — SIGNIFICANT CHANGE UP
PCO2 BLDV: 73 MMHG — HIGH (ref 41–51)
PH BLDV: 7.3 PH — LOW (ref 7.32–7.43)
PLATELET # BLD AUTO: 201 K/UL — SIGNIFICANT CHANGE UP (ref 150–400)
PMV BLD: 9.6 FL — SIGNIFICANT CHANGE UP (ref 7–13)
PO2 BLDV: 27 MMHG — LOW (ref 35–40)
POTASSIUM BLDV-SCNC: 4.1 MMOL/L — SIGNIFICANT CHANGE UP (ref 3.4–4.5)
POTASSIUM SERPL-MCNC: 4.3 MMOL/L — SIGNIFICANT CHANGE UP (ref 3.5–5.3)
POTASSIUM SERPL-SCNC: 4.3 MMOL/L — SIGNIFICANT CHANGE UP (ref 3.5–5.3)
PROT SERPL-MCNC: 7.1 G/DL — SIGNIFICANT CHANGE UP (ref 6–8.3)
RBC # BLD: 4.86 M/UL — SIGNIFICANT CHANGE UP (ref 4.2–5.8)
RBC # FLD: 12 % — SIGNIFICANT CHANGE UP (ref 10.3–14.5)
SAO2 % BLDV: 41.6 % — LOW (ref 60–85)
SODIUM SERPL-SCNC: 142 MMOL/L — SIGNIFICANT CHANGE UP (ref 135–145)
WBC # BLD: 8.63 K/UL — SIGNIFICANT CHANGE UP (ref 3.8–10.5)
WBC # FLD AUTO: 8.63 K/UL — SIGNIFICANT CHANGE UP (ref 3.8–10.5)

## 2018-06-01 PROCEDURE — 71045 X-RAY EXAM CHEST 1 VIEW: CPT | Mod: 26

## 2018-06-01 RX ORDER — LEVETIRACETAM 250 MG/1
1500 TABLET, FILM COATED ORAL ONCE
Qty: 0 | Refills: 0 | Status: COMPLETED | OUTPATIENT
Start: 2018-06-01 | End: 2018-06-01

## 2018-06-01 NOTE — ED ADULT TRIAGE NOTE - CADM TRG TX PRIOR TO ARRIVAL
Procedure: Ear Lavage     Irrigated bilateral ear(s) using 800 ml of water. Significant observations if any: red Lt ear canal. Removal took a moderate amount of time  and was somewhat difficult.  Patient tolerated the procedure well.   none

## 2018-06-01 NOTE — ED ADULT NURSE NOTE - OBJECTIVE STATEMENT
pt revieved to trauma room A. Pt is nonverbal and bedbound at baseline. As per mother at the bedside patient has 2  focal seizures this evening , 20 minutes of each other ,each lasting a one minute. pt was given diastat both seizures. no injuries sustained during seizures. pt appears comfortable at the moment. no signs of seizures at the moment. 20 placed in left ac. pt placed on continuous tele monitoring. sinus tachy on cm  .vss afebrile. md at bedside for eval. will continue to monitor pt revieved to trauma room A. Pt is nonverbal and bedbound at baseline. As per mother at the bedside patient has 2  focal seizures this evening , 20 minutes of each other ,each lasting a one minute. pt was given diastat after both seizures. no injuries sustained during seizures. pt appears comfortable at the moment. no signs of seizures at the moment. 20 placed in left ac. pt placed on continuous tele monitoring. sinus tachy on cm  .vss afebrile. md at bedside for eval. will continue to monitor

## 2018-06-01 NOTE — ED ADULT TRIAGE NOTE - CHIEF COMPLAINT QUOTE
pt arrives w/ c/o seizures. as per pt mom pt baseline nonverbal and bedbound. Mom states pt last seizure before today was 6 months ago. PMH Cerebral palsy Fsx in field [id number]

## 2018-06-02 DIAGNOSIS — G40.909 EPILEPSY, UNSPECIFIED, NOT INTRACTABLE, WITHOUT STATUS EPILEPTICUS: ICD-10-CM

## 2018-06-02 DIAGNOSIS — R56.9 UNSPECIFIED CONVULSIONS: ICD-10-CM

## 2018-06-02 DIAGNOSIS — Z29.9 ENCOUNTER FOR PROPHYLACTIC MEASURES, UNSPECIFIED: ICD-10-CM

## 2018-06-02 LAB
BUN SERPL-MCNC: 12 MG/DL — SIGNIFICANT CHANGE UP (ref 7–23)
CALCIUM SERPL-MCNC: 9.1 MG/DL — SIGNIFICANT CHANGE UP (ref 8.4–10.5)
CHLORIDE SERPL-SCNC: 99 MMOL/L — SIGNIFICANT CHANGE UP (ref 98–107)
CO2 SERPL-SCNC: 28 MMOL/L — SIGNIFICANT CHANGE UP (ref 22–31)
CREAT SERPL-MCNC: 0.61 MG/DL — SIGNIFICANT CHANGE UP (ref 0.5–1.3)
GLUCOSE SERPL-MCNC: 91 MG/DL — SIGNIFICANT CHANGE UP (ref 70–99)
HCT VFR BLD CALC: 46.2 % — SIGNIFICANT CHANGE UP (ref 39–50)
HGB BLD-MCNC: 15.2 G/DL — SIGNIFICANT CHANGE UP (ref 13–17)
MAGNESIUM SERPL-MCNC: 2.3 MG/DL — SIGNIFICANT CHANGE UP (ref 1.6–2.6)
MCHC RBC-ENTMCNC: 30.8 PG — SIGNIFICANT CHANGE UP (ref 27–34)
MCHC RBC-ENTMCNC: 32.9 % — SIGNIFICANT CHANGE UP (ref 32–36)
MCV RBC AUTO: 93.5 FL — SIGNIFICANT CHANGE UP (ref 80–100)
NRBC # FLD: 0 — SIGNIFICANT CHANGE UP
PHOSPHATE SERPL-MCNC: 4.1 MG/DL — SIGNIFICANT CHANGE UP (ref 2.5–4.5)
PLATELET # BLD AUTO: 192 K/UL — SIGNIFICANT CHANGE UP (ref 150–400)
PMV BLD: 9.6 FL — SIGNIFICANT CHANGE UP (ref 7–13)
POTASSIUM SERPL-MCNC: 4 MMOL/L — SIGNIFICANT CHANGE UP (ref 3.5–5.3)
POTASSIUM SERPL-SCNC: 4 MMOL/L — SIGNIFICANT CHANGE UP (ref 3.5–5.3)
RBC # BLD: 4.94 M/UL — SIGNIFICANT CHANGE UP (ref 4.2–5.8)
RBC # FLD: 12 % — SIGNIFICANT CHANGE UP (ref 10.3–14.5)
SODIUM SERPL-SCNC: 142 MMOL/L — SIGNIFICANT CHANGE UP (ref 135–145)
WBC # BLD: 9.06 K/UL — SIGNIFICANT CHANGE UP (ref 3.8–10.5)
WBC # FLD AUTO: 9.06 K/UL — SIGNIFICANT CHANGE UP (ref 3.8–10.5)

## 2018-06-02 PROCEDURE — 99223 1ST HOSP IP/OBS HIGH 75: CPT

## 2018-06-02 PROCEDURE — 99223 1ST HOSP IP/OBS HIGH 75: CPT | Mod: GC

## 2018-06-02 PROCEDURE — 12345: CPT | Mod: NC

## 2018-06-02 RX ORDER — LAMOTRIGINE 25 MG/1
1 TABLET, ORALLY DISINTEGRATING ORAL
Qty: 0 | Refills: 0 | COMMUNITY

## 2018-06-02 RX ORDER — LAMOTRIGINE 25 MG/1
200 TABLET, ORALLY DISINTEGRATING ORAL
Qty: 0 | Refills: 0 | Status: DISCONTINUED | OUTPATIENT
Start: 2018-06-02 | End: 2018-06-05

## 2018-06-02 RX ORDER — LEVETIRACETAM 250 MG/1
1000 TABLET, FILM COATED ORAL
Qty: 0 | Refills: 0 | Status: DISCONTINUED | OUTPATIENT
Start: 2018-06-02 | End: 2018-06-04

## 2018-06-02 RX ORDER — LEVETIRACETAM 250 MG/1
1500 TABLET, FILM COATED ORAL
Qty: 0 | Refills: 0 | Status: DISCONTINUED | OUTPATIENT
Start: 2018-06-02 | End: 2018-06-05

## 2018-06-02 RX ORDER — FAMOTIDINE 10 MG/ML
20 INJECTION INTRAVENOUS DAILY
Qty: 0 | Refills: 0 | Status: DISCONTINUED | OUTPATIENT
Start: 2018-06-02 | End: 2018-06-05

## 2018-06-02 RX ORDER — RANITIDINE HYDROCHLORIDE 150 MG/1
0 TABLET, FILM COATED ORAL
Qty: 0 | Refills: 0 | COMMUNITY

## 2018-06-02 RX ADMIN — LEVETIRACETAM 1500 MILLIGRAM(S): 250 TABLET, FILM COATED ORAL at 17:10

## 2018-06-02 RX ADMIN — LEVETIRACETAM 1000 MILLIGRAM(S): 250 TABLET, FILM COATED ORAL at 07:02

## 2018-06-02 RX ADMIN — LAMOTRIGINE 200 MILLIGRAM(S): 25 TABLET, ORALLY DISINTEGRATING ORAL at 07:01

## 2018-06-02 RX ADMIN — LAMOTRIGINE 200 MILLIGRAM(S): 25 TABLET, ORALLY DISINTEGRATING ORAL at 17:10

## 2018-06-02 RX ADMIN — FAMOTIDINE 20 MILLIGRAM(S): 10 INJECTION INTRAVENOUS at 17:10

## 2018-06-02 RX ADMIN — LEVETIRACETAM 400 MILLIGRAM(S): 250 TABLET, FILM COATED ORAL at 00:18

## 2018-06-02 NOTE — CONSULT NOTE ADULT - ATTENDING COMMENTS
Patient seen and examined and above note reviewed and I agree with assessment and plan as outlined. Patient with known history of seizures and CP and followed by Dr. Moreno in epilepsy clinic and presents with 4 focal with secondary generalized seizures. NO reported fevers, chills, cough or diarrhea or changes in seizure medications.   Exam is currently back to baseline and he is nonverbal and has contractures of arms and legs. We will proceed with routine bedside EEG and check urinalysis and lamictal levels and continue with current doses of his anti seizure meds.  Continue medical management and supportive care and all questions answered.

## 2018-06-02 NOTE — H&P ADULT - NSHPSOCIALHISTORY_GEN_ALL_CORE
Lives with parents, is wheelchair bound and non-verbal at baseline  Goes to a day program at [facility] 5x a week

## 2018-06-02 NOTE — CONSULT NOTE ADULT - ASSESSMENT
22 year old male with CP and epilpesy presenting with increasing frequency of seizures in the past day with 4 episodes of head turning. Exam stable and labs non-revealing. Patient loaded with 1500mg of keppra in ED.   Unclear of why increased seizure frequency. Would be prudent to monitor overnight and obtain routine EEG. 22 year old male with CP and epilpesy presenting with increasing frequency of seizures in the past day with 4 episodes of left head turning. Exam stable and labs non-revealing. Patient loaded with 1500mg of keppra in ED.   Unclear of why increased seizure frequency. Would be prudent to monitor overnight and obtain routine EEG.

## 2018-06-02 NOTE — H&P ADULT - NSHPREVIEWOFSYSTEMS_GEN_ALL_CORE
Unable to obtain a ROS from patient 2/2 non-verbal status at baseline. As per mother, patient with chills in ED

## 2018-06-02 NOTE — ED PROVIDER NOTE - MEDICAL DECISION MAKING DETAILS
pt with sz d/o witnessed sz    baseline difficult to determine    d/w neuro   will keppra load    check CXR  UA  labs  neuro to see

## 2018-06-02 NOTE — ED PROVIDER NOTE - OBJECTIVE STATEMENT
pt with CP sz d/o  witnessed sz x 2 by family    Recent inpt eval   for sz acc to family  none observed  no reported fever  GI sxs cough or witnessed SOB  ([place]) pt with CP sz d/o  witnessed sz x 2 by family    Recent inpt eval   for sz acc to family  none observed  no reported fever  GI sxs cough or witnessed SOB  ([place])    PCP: Dr Holger Mccormick   Neurologist: Dr Skip Moreno

## 2018-06-02 NOTE — ED PROVIDER NOTE - PHYSICAL EXAMINATION
Boo  pt alert at some ponts    witnessed with episode of eyes deviating to left  no associated movement  lasted < 1 minute  afterward  appeared alert   looking around   ([place]) Boo  pt alert at some points    witnessed with episode of eyes deviating to left  no associated movement  lasted < 1 minute  afterward  appeared alert   looking around   ([place])

## 2018-06-02 NOTE — CONSULT NOTE ADULT - PROBLEM SELECTOR RECOMMENDATION 9
Admit overnight for observation of further seizures.   Routine EEG.   Check lamictal levels.   Continue keppra 1000/1500 and lamictal 200/200 (already got daily dose).   Seizure precautions. Ativan 2mg IV for GTC lasting greater than 3 minutes of increased frequency of focal events.

## 2018-06-02 NOTE — H&P ADULT - HISTORY OF PRESENT ILLNESS
Please note that the patient is non-verbal at baseline, HPI and some ROS obtained from the mother    This is a 22M with history of Fragile X, Cerebral Palsy and Seizure d/o who is brought to the hospital by his mother because of 2 focal seizures at home. The mother said that she had noted that the patient's head was turned to the left with his eyes also turned to left and was not responding in his usual manner. She gave him a dose of diazepam with improvement in his symptoms but he soon started to exhibit similar seizure like activity again about 20 minutes later and she gave him another dose of diazepam and she then brought the patient to the hospital for evaluation of his seizures. Said that the patient usually is well controlled on hid AEDs and his last seizure episode was about 6 months ago, said that if his seizures occur they usually resolve with the diazepam and don't reoccur that close to each other. She denies any fevers or diaphoresis for the patient but said that the patient seemed to be having chills in the ED. She denied any changes in the patient's diet or medications. Said that the patient was on tetracycline for acne recently but had finished that medication about 1 month ago. No other complaints.     In the ED, his vitals were T 98.6, Tmax 100.1, P 108, /83, R 16, O2 sat 98% RA. His lab work did not show any acute abnormalities. His CXR showed clear lungs. Patient's mother noted that the patient had another 2 episodes of focal seizures in the ED but they lasted for less than 2 minutes each. He was given keppra 1500mg IVPB x1. He was evaluated by neurology and was admitted to medicine.

## 2018-06-02 NOTE — H&P ADULT - ASSESSMENT
This is a 22M with history of Fragile X, Cerebral Palsy, and Seizure d/o who presents from home with increased seizure activity.

## 2018-06-02 NOTE — H&P ADULT - NSHPLABSRESULTS_GEN_ALL_CORE
LABS and ADDITIONAL STUDIES:                        15.1   8.63  )-----------( 201      ( 01 Jun 2018 22:00 )             47.1     06-01    142  |  101  |  13  ----------------------------<  90  4.3   |  34<H>  |  0.63    Ca    9.2      01 Jun 2018 21:55    TPro  7.1  /  Alb  4.8  /  TBili  0.2  /  DBili  x   /  AST  14  /  ALT  13  /  AlkPhos  76  06-01    LIVER FUNCTIONS - ( 01 Jun 2018 21:55 )  Alb: 4.8 g/dL / Pro: 7.1 g/dL / ALK PHOS: 76 u/L / ALT: 13 u/L / AST: 14 u/L / GGT: x           Lactate, VBG - 1.1    < from: Xray Chest 1 View- PORTABLE-Urgent (06.01.18 @ 22:49) >    ******PRELIMINARY REPORT******            INTERPRETATION:  Clear Lungs    < end of copied text >

## 2018-06-02 NOTE — H&P ADULT - PROBLEM SELECTOR PLAN 1
- Unclear cause of patient's increased seizure activity, no recent changes in medications (tetracycline was ~1 month ago as per mother), no changes in diet, he is noted to have a temp of 100.1 in ED and the mother said that he looked like he had chills but she denies any other infectious complaints  - For now, will check BCx and UCx, will monitor for further fevers and reassess if they occur  - Neurology eval appreciated, will obtain EEG, will c/w home keppra and lamictal, ativan prn seizure episodes

## 2018-06-02 NOTE — CONSULT NOTE ADULT - SUBJECTIVE AND OBJECTIVE BOX
Neurology Consult    Name  IRINA JACOBSEN    22 year old male with CP and seizures. Patient has been controlled in terms of epilepsy on keppra and lamictal. Today he had 2 episodes of head turning and decreased responsiveness lasting about 5 minutes for which he was given diastat. After the second episode, he was agitated post-ictally. Family reports no change in patient's behavior or appetite, no signs of infection. No fever but did feel warm after seizures. Had 2 more witnessed events in the ED. Last seizure was many months ago and typically gets only a few a year consisting of focal seizures with head turning and also GTC's. He sees Dr. Moreno as outpatient and had a recent EMU admission during which no changes were made to his medications. At baseline, patient is non-verbal and wheelchair bound. Attends day program.   Current meds: keppra 1000mg/40776jt, lamicatal 200mg/200mg                                                   PAST MEDICAL & SURGICAL HISTORY:  Seizure disorder  Fragile X syndrome  Cerebral palsy  Bilateral hip dislocation, subsequent encounter  Adolescent idiopathic scoliosis          MEDICATIONS  (STANDING):    MEDICATIONS  (PRN):      Allergies    No Known Allergies    Intolerances        Objective  Vital Signs Last 24 Hrs  T(C): 36.9 (01 Jun 2018 22:01), Max: 37 (01 Jun 2018 21:25)  T(F): 98.4 (01 Jun 2018 22:01), Max: 98.6 (01 Jun 2018 21:25)  HR: 100 (01 Jun 2018 22:01) (100 - 108)  BP: 105/72 (01 Jun 2018 22:01) (105/72 - 115/83)  BP(mean): --  RR: 15 (01 Jun 2018 22:01) (15 - 16)  SpO2: 100% (01 Jun 2018 22:01) (98% - 100%)    General Exam   General appearance: No acute distress, well-nourished  Respiratory:    non-labored respirations               Neurological Exam  Mental Status:  opens eyes to voice, no spontaneous speech, smiles at parents    Cranial Nerves: PERRL, pupils 4mm, EOMI without nystagmus, bilateral blink to threat, no facial droop    Motor:   Tone:   decreased               Strength: some spontaneous and purposeful movements of upper extremities, lower extremities crossed over themselves minimal movement of lower extremities    Tremor:  none appreciated at rest or in action    Toes flexor bilaterally upgoing    Gait:     Other Studies                          15.1   8.63  )-----------( 201      ( 01 Jun 2018 22:00 )             47.1     06-01    142  |  101  |  13  ----------------------------<  90  4.3   |  34<H>  |  0.63    Ca    9.2      01 Jun 2018 21:55    TPro  7.1  /  Alb  4.8  /  TBili  0.2  /  DBili  x   /  AST  14  /  ALT  13  /  AlkPhos  76  06-01    LIVER FUNCTIONS - ( 01 Jun 2018 21:55 )  Alb: 4.8 g/dL / Pro: 7.1 g/dL / ALK PHOS: 76 u/L / ALT: 13 u/L / AST: 14 u/L / GGT: x Neurology Consult    Name  IRINA JACOBSEN    22 year old male with CP and epilepsy. Patient has been controlled in terms of epilepsy on keppra and lamictal. Today he had 2 episodes of head turning and decreased responsiveness lasting about 5 minutes for which he was given diastat. After the second episode, he was agitated post-ictally. Family reports no change in patient's behavior or appetite, no signs of infection. No fever but did feel warm after seizures. Had 2 more witnessed events in the ED. Last seizure was many months ago and typically gets only a few a year consisting of focal seizures with head turning and also GTC's. He sees Dr. Moreno as outpatient and had a recent EMU admission during which no changes were made to his medications. At baseline, patient is non-verbal and wheelchair bound. Attends day program.   Current meds: keppra 1000mg/45542dn, lamicatal 200mg/200mg                                                   PAST MEDICAL & SURGICAL HISTORY:  Seizure disorder  Fragile X syndrome  Cerebral palsy  Bilateral hip dislocation, subsequent encounter  Adolescent idiopathic scoliosis          MEDICATIONS  (STANDING):    MEDICATIONS  (PRN):      Allergies    No Known Allergies    Intolerances        Objective  Vital Signs Last 24 Hrs  T(C): 36.9 (01 Jun 2018 22:01), Max: 37 (01 Jun 2018 21:25)  T(F): 98.4 (01 Jun 2018 22:01), Max: 98.6 (01 Jun 2018 21:25)  HR: 100 (01 Jun 2018 22:01) (100 - 108)  BP: 105/72 (01 Jun 2018 22:01) (105/72 - 115/83)  BP(mean): --  RR: 15 (01 Jun 2018 22:01) (15 - 16)  SpO2: 100% (01 Jun 2018 22:01) (98% - 100%)    General Exam   General appearance: No acute distress, well-nourished  Respiratory:    non-labored respirations               Neurological Exam  Mental Status:  opens eyes to voice, no spontaneous speech, smiles at parents    Cranial Nerves: PERRL, pupils 4mm, EOMI without nystagmus, bilateral blink to threat, no facial droop    Motor:   Tone:   decreased               Strength: some spontaneous and purposeful movements of upper extremities, lower extremities crossed over themselves minimal movement of lower extremities    Tremor:  none appreciated at rest or in action    Toes flexor bilaterally upgoing    Gait:     Other Studies                          15.1   8.63  )-----------( 201      ( 01 Jun 2018 22:00 )             47.1     06-01    142  |  101  |  13  ----------------------------<  90  4.3   |  34<H>  |  0.63    Ca    9.2      01 Jun 2018 21:55    TPro  7.1  /  Alb  4.8  /  TBili  0.2  /  DBili  x   /  AST  14  /  ALT  13  /  AlkPhos  76  06-01    LIVER FUNCTIONS - ( 01 Jun 2018 21:55 )  Alb: 4.8 g/dL / Pro: 7.1 g/dL / ALK PHOS: 76 u/L / ALT: 13 u/L / AST: 14 u/L / GGT: x Neurology Consult    Name  IRINA JACOBSEN    22 year old male with CP and epilepsy. Patient has been controlled in terms of epilepsy on keppra and lamictal. Today he had 2 episodes of head turning and decreased responsiveness lasting about 5 minutes for which he was given diastat. After the second episode, he was agitated post-ictally. Family reports no change in patient's behavior or appetite, no signs of infection. No fever but did feel warm after seizures. Was started on tetracylcine for 1 month about 1 month ago for acne but has stopped. Had 2 more witnessed events in the ED. Last seizure was many months ago and typically gets only a few a year consisting of focal seizures with head turning and also GTC's. He sees Dr. Moreno as outpatient and had a recent EMU admission during which no changes were made to his medications. At baseline, patient is non-verbal and wheelchair bound. Attends day program.   Current meds: keppra 1000mg/25166zd, lamicatal 200mg/200mg                                                   PAST MEDICAL & SURGICAL HISTORY:  Seizure disorder  Fragile X syndrome  Cerebral palsy  Bilateral hip dislocation, subsequent encounter  Adolescent idiopathic scoliosis          MEDICATIONS  (STANDING):    MEDICATIONS  (PRN):      Allergies    No Known Allergies    Intolerances        Objective  Vital Signs Last 24 Hrs  T(C): 36.9 (01 Jun 2018 22:01), Max: 37 (01 Jun 2018 21:25)  T(F): 98.4 (01 Jun 2018 22:01), Max: 98.6 (01 Jun 2018 21:25)  HR: 100 (01 Jun 2018 22:01) (100 - 108)  BP: 105/72 (01 Jun 2018 22:01) (105/72 - 115/83)  BP(mean): --  RR: 15 (01 Jun 2018 22:01) (15 - 16)  SpO2: 100% (01 Jun 2018 22:01) (98% - 100%)    General Exam   General appearance: No acute distress, well-nourished  Respiratory:    non-labored respirations               Neurological Exam  Mental Status:  opens eyes to voice, no spontaneous speech, smiles at parents    Cranial Nerves: PERRL, pupils 4mm, EOMI without nystagmus, bilateral blink to threat, no facial droop    Motor:   Tone:   decreased               Strength: some spontaneous and purposeful movements of upper extremities, lower extremities crossed over themselves minimal movement of lower extremities    Tremor:  none appreciated at rest or in action    Toes flexor bilaterally upgoing    Gait:     Other Studies                          15.1   8.63  )-----------( 201      ( 01 Jun 2018 22:00 )             47.1     06-01    142  |  101  |  13  ----------------------------<  90  4.3   |  34<H>  |  0.63    Ca    9.2      01 Jun 2018 21:55    TPro  7.1  /  Alb  4.8  /  TBili  0.2  /  DBili  x   /  AST  14  /  ALT  13  /  AlkPhos  76  06-01    LIVER FUNCTIONS - ( 01 Jun 2018 21:55 )  Alb: 4.8 g/dL / Pro: 7.1 g/dL / ALK PHOS: 76 u/L / ALT: 13 u/L / AST: 14 u/L / GGT: x Neurology Consult    Name  IRINA JACOBSEN    22 year old male with CP and epilepsy. Patient has been controlled in terms of epilepsy on keppra and lamictal. Today he had 2 episodes of head turning to the left with left and downward gaze deviation and decreased responsiveness lasting about 5 minutes for which he was given diastat. After the second episode, he was agitated post-ictally. Family reports no change in patient's behavior or appetite, no signs of infection. No fever but did feel warm after seizures. Was started on tetracylcine for 1 month about 1 month ago for acne but has stopped. Had 2 more witnessed events in the ED. Last seizure was many months ago and typically gets only a few a year consisting of focal seizures with head turning and also GTC's. He sees Dr. Moreno as outpatient and had a recent EMU admission during which no changes were made to his medications. At baseline, patient is non-verbal and wheelchair bound. Attends day program.   Current meds: keppra 1000mg/37464qg, lamicatal 200mg/200mg                                                   PAST MEDICAL & SURGICAL HISTORY:  Seizure disorder  Fragile X syndrome  Cerebral palsy  Bilateral hip dislocation, subsequent encounter  Adolescent idiopathic scoliosis          MEDICATIONS  (STANDING):    MEDICATIONS  (PRN):      Allergies    No Known Allergies    Intolerances        Objective  Vital Signs Last 24 Hrs  T(C): 36.9 (01 Jun 2018 22:01), Max: 37 (01 Jun 2018 21:25)  T(F): 98.4 (01 Jun 2018 22:01), Max: 98.6 (01 Jun 2018 21:25)  HR: 100 (01 Jun 2018 22:01) (100 - 108)  BP: 105/72 (01 Jun 2018 22:01) (105/72 - 115/83)  BP(mean): --  RR: 15 (01 Jun 2018 22:01) (15 - 16)  SpO2: 100% (01 Jun 2018 22:01) (98% - 100%)    General Exam   General appearance: No acute distress, well-nourished  Respiratory:    non-labored respirations               Neurological Exam  Mental Status:  opens eyes to voice, no spontaneous speech, smiles at parents    Cranial Nerves: PERRL, pupils 4mm, EOMI without nystagmus, bilateral blink to threat, no facial droop    Motor:   Tone:   decreased               Strength: some spontaneous and purposeful movements of upper extremities, lower extremities crossed over themselves minimal movement of lower extremities    Tremor:  none appreciated at rest or in action    Toes flexor bilaterally upgoing    Gait:     Other Studies                          15.1   8.63  )-----------( 201      ( 01 Jun 2018 22:00 )             47.1     06-01    142  |  101  |  13  ----------------------------<  90  4.3   |  34<H>  |  0.63    Ca    9.2      01 Jun 2018 21:55    TPro  7.1  /  Alb  4.8  /  TBili  0.2  /  DBili  x   /  AST  14  /  ALT  13  /  AlkPhos  76  06-01    LIVER FUNCTIONS - ( 01 Jun 2018 21:55 )  Alb: 4.8 g/dL / Pro: 7.1 g/dL / ALK PHOS: 76 u/L / ALT: 13 u/L / AST: 14 u/L / GGT: x

## 2018-06-02 NOTE — ED PROVIDER NOTE - ATTENDING CONTRIBUTION TO CARE
Lin  pt with sz d/o witnessed sz at home  ? sz witnessewd here    called neuro  rec keppra load   CXR clear   labs sent   full neuro eval pending at time of sign over Lin  pt with sz d/o witnessed sz at home  brief episode witnessed here    called neuro  rec keppra load   CXR clear   labs sent   full neuro eval pending at time of sign over

## 2018-06-02 NOTE — H&P ADULT - NSHPPHYSICALEXAM_GEN_ALL_CORE
Vital Signs Last 24 Hrs  T(C): 36.6 (02 Jun 2018 04:23), Max: 37.8 (02 Jun 2018 02:05)  T(F): 97.8 (02 Jun 2018 04:23), Max: 100.1 (02 Jun 2018 02:05)  HR: 97 (02 Jun 2018 04:23) (89 - 108)  BP: 109/68 (02 Jun 2018 04:23) (105/72 - 115/83)  BP(mean): --  RR: 16 (02 Jun 2018 04:23) (14 - 16)  SpO2: 97% (02 Jun 2018 04:23) (97% - 100%)    GENERAL: No acute distress, well-developed  HEAD:  Atraumatic, Normocephalic  ENT: EOMI, PERRLA, conjunctiva and sclera clear, Neck supple, No JVD, moist mucosa  CHEST/LUNG: Clear to auscultation bilaterally; No wheeze, equal breath sounds bilaterally   BACK: No spinal tenderness  HEART: Regular rate and rhythm; No murmurs, rubs, or gallops  ABDOMEN: Soft, Nontender, Nondistended; Bowel sounds present  EXTREMITIES:  No clubbing, cyanosis, or edema  PSYCH: nl behavior, no agitation  NEUROLOGY: responsive to voice and turns towards parents when awake, moves UE without noticeable defecits, LE contracted  SKIN: Normal color, No rashes or lesions

## 2018-06-03 LAB
SPECIMEN SOURCE: SIGNIFICANT CHANGE UP
SPECIMEN SOURCE: SIGNIFICANT CHANGE UP

## 2018-06-03 PROCEDURE — 99233 SBSQ HOSP IP/OBS HIGH 50: CPT

## 2018-06-03 RX ADMIN — FAMOTIDINE 20 MILLIGRAM(S): 10 INJECTION INTRAVENOUS at 17:10

## 2018-06-03 RX ADMIN — LEVETIRACETAM 1000 MILLIGRAM(S): 250 TABLET, FILM COATED ORAL at 05:52

## 2018-06-03 RX ADMIN — LEVETIRACETAM 1500 MILLIGRAM(S): 250 TABLET, FILM COATED ORAL at 17:10

## 2018-06-03 RX ADMIN — LAMOTRIGINE 200 MILLIGRAM(S): 25 TABLET, ORALLY DISINTEGRATING ORAL at 17:10

## 2018-06-03 RX ADMIN — LAMOTRIGINE 200 MILLIGRAM(S): 25 TABLET, ORALLY DISINTEGRATING ORAL at 05:52

## 2018-06-04 ENCOUNTER — TRANSCRIPTION ENCOUNTER (OUTPATIENT)
Age: 23
End: 2018-06-04

## 2018-06-04 LAB
APPEARANCE UR: SIGNIFICANT CHANGE UP
BACTERIA # UR AUTO: SIGNIFICANT CHANGE UP
BILIRUB UR-MCNC: NEGATIVE — SIGNIFICANT CHANGE UP
BLOOD UR QL VISUAL: NEGATIVE — SIGNIFICANT CHANGE UP
COLOR SPEC: YELLOW — SIGNIFICANT CHANGE UP
GLUCOSE UR-MCNC: NEGATIVE — SIGNIFICANT CHANGE UP
KETONES UR-MCNC: NEGATIVE — SIGNIFICANT CHANGE UP
LAMOTRIGINE SERPL-MCNC: 5.6 MCG/ML — SIGNIFICANT CHANGE UP (ref 2.5–15)
LAMOTRIGINE SERPL-MCNC: 6.1 MCG/ML — SIGNIFICANT CHANGE UP (ref 2.5–15)
LEUKOCYTE ESTERASE UR-ACNC: NEGATIVE — SIGNIFICANT CHANGE UP
LEVETIRACETAM SERPL-MCNC: 36.9 MCG/ML — SIGNIFICANT CHANGE UP (ref 12–46)
MUCOUS THREADS # UR AUTO: SIGNIFICANT CHANGE UP
NITRITE UR-MCNC: NEGATIVE — SIGNIFICANT CHANGE UP
PH UR: 6 — SIGNIFICANT CHANGE UP (ref 4.6–8)
PROT UR-MCNC: 20 MG/DL — SIGNIFICANT CHANGE UP
RBC CASTS # UR COMP ASSIST: HIGH (ref 0–?)
SP GR SPEC: 1.02 — SIGNIFICANT CHANGE UP (ref 1–1.04)
SQUAMOUS # UR AUTO: SIGNIFICANT CHANGE UP
UROBILINOGEN FLD QL: NORMAL MG/DL — SIGNIFICANT CHANGE UP
WBC UR QL: SIGNIFICANT CHANGE UP (ref 0–?)

## 2018-06-04 PROCEDURE — 99233 SBSQ HOSP IP/OBS HIGH 50: CPT

## 2018-06-04 PROCEDURE — 95819 EEG AWAKE AND ASLEEP: CPT | Mod: 26

## 2018-06-04 RX ORDER — LEVETIRACETAM 250 MG/1
1500 TABLET, FILM COATED ORAL
Qty: 0 | Refills: 0 | Status: DISCONTINUED | OUTPATIENT
Start: 2018-06-04 | End: 2018-06-05

## 2018-06-04 RX ADMIN — LAMOTRIGINE 200 MILLIGRAM(S): 25 TABLET, ORALLY DISINTEGRATING ORAL at 17:08

## 2018-06-04 RX ADMIN — LEVETIRACETAM 1000 MILLIGRAM(S): 250 TABLET, FILM COATED ORAL at 05:50

## 2018-06-04 RX ADMIN — LEVETIRACETAM 1500 MILLIGRAM(S): 250 TABLET, FILM COATED ORAL at 17:08

## 2018-06-04 RX ADMIN — LAMOTRIGINE 200 MILLIGRAM(S): 25 TABLET, ORALLY DISINTEGRATING ORAL at 05:50

## 2018-06-04 RX ADMIN — FAMOTIDINE 20 MILLIGRAM(S): 10 INJECTION INTRAVENOUS at 17:08

## 2018-06-04 NOTE — DISCHARGE NOTE ADULT - CARE PROVIDER_API CALL
Schoenberg, Laura G (DO), Neurology  [address] [place] [address]  Phone: [phone]  Fax: [phone] Schoenberg, Laura G (DO), Neurology  [address] [place] [address]  Phone: [phone]  Fax: [phone]    Skip Moreno), Clinical Neurophysiology; EEGEpilepsy; Neurology  [address] [address]  Phone: [phone]  Fax: [phone]

## 2018-06-04 NOTE — DISCHARGE NOTE ADULT - PATIENT PORTAL LINK FT
You can access the [facility], offered by [facility], by registering with the following website: [email or web address]

## 2018-06-04 NOTE — DISCHARGE NOTE ADULT - MEDICATION SUMMARY - MEDICATIONS TO TAKE
I will START or STAY ON the medications listed below when I get home from the hospital:    LaMICtal 200 mg oral tablet  -- 1 tab(s) by mouth 2 times a day  -- Indication: For Seizure    levETIRAcetam 750 mg oral tablet  -- 2 tab(s) by mouth 2 times a day   -- Indication: For Seizure    raNITIdine 75 mg oral tablet  -- take 1/2 tab in the morning and 1 1/2 tablets in the evening.  -- Indication: For Need for prophylactic measure

## 2018-06-04 NOTE — DISCHARGE NOTE ADULT - HOME CARE AGENCY
[facility] provides all home care services including [facility] 3 days 3-8 pm, 2 days 3-7 pm, Sat 1-8 pm. [phone] [place]. [facility], [place] [phone]. [facility] 3 days 3-8 pm, 2 days 3-7 pm, Sat 1-8 pm. [phone] [place].

## 2018-06-04 NOTE — DISCHARGE NOTE ADULT - CARE PLAN
Principal Discharge DX:	Seizure  Goal:	To be free of seizures  Assessment and plan of treatment:	Follow up with Neurology as out patient DR Schoenberg  Secondary Diagnosis:	Cerebral palsy  Assessment and plan of treatment:	F/u PCP as out pt in a week Principal Discharge DX:	Seizure  Goal:	To be free of seizures  Assessment and plan of treatment:	Follow up with Neurology as out patient DR Josh Cedillo was increased to 1500mg BID  Secondary Diagnosis:	Cerebral palsy  Assessment and plan of treatment:	F/u PCP as out pt in a week

## 2018-06-04 NOTE — CHART NOTE - NSCHARTNOTEFT_GEN_A_CORE
Medical attending addendum:    Spoke to Neurologist Dr. Schoenberg and discussed case with her. At this time etiology for increased seizures remains unclear. Per recommendations, will increase keppra to 1500BID if family is agreeable. Dr. Schoenberg will see patient either tonight or early tomorrow morning. Plan to DC patient tomorrow.

## 2018-06-04 NOTE — DISCHARGE NOTE ADULT - MEDICATION SUMMARY - MEDICATIONS TO CHANGE
I will SWITCH the dose or number of times a day I take the medications listed below when I get home from the hospital:    Keppra 1000 mg oral tablet  -- 1 tab(s) by mouth in AM and   one and half tablet in evening

## 2018-06-04 NOTE — DISCHARGE NOTE ADULT - PLAN OF CARE
To be free of seizures Follow up with Neurology as out patient DR Schoenberg F/u PCP as out pt in a week Follow up with Neurology as out patient DR Josh Cedillo was increased to 1500mg BID

## 2018-06-04 NOTE — DISCHARGE NOTE ADULT - CARE PROVIDERS DIRECT ADDRESSES
[email or web address]

## 2018-06-04 NOTE — DISCHARGE NOTE ADULT - HOSPITAL COURSE
22M with history of Fragile X, Cerebral Palsy, and Seizure d/o who presents from home with increased seizure activity    Seizure  -Unclear cause of patient's increased seizure activity at this time.   -No recent changes in medications and per family patient did not miss any doses  -He was noted to have a temp of 100.1 in ED and the mother said that he looked like he had chills but she denies any other infectious complaints  - Blood culture and cxr unremarkable. UA neg   - Neurology eval appreciated  Recommend Increase Keppra 1500mg BID        Dispo Home with HOme Care .

## 2018-06-05 VITALS
RESPIRATION RATE: 18 BRPM | TEMPERATURE: 98 F | OXYGEN SATURATION: 96 % | HEART RATE: 93 BPM | SYSTOLIC BLOOD PRESSURE: 97 MMHG | DIASTOLIC BLOOD PRESSURE: 67 MMHG

## 2018-06-05 LAB
BACTERIA UR CULT: SIGNIFICANT CHANGE UP
SPECIMEN SOURCE: SIGNIFICANT CHANGE UP

## 2018-06-05 PROCEDURE — 99239 HOSP IP/OBS DSCHRG MGMT >30: CPT

## 2018-06-05 RX ORDER — LEVETIRACETAM 250 MG/1
1 TABLET, FILM COATED ORAL
Qty: 0 | Refills: 0 | COMMUNITY

## 2018-06-05 RX ORDER — LEVETIRACETAM 250 MG/1
2 TABLET, FILM COATED ORAL
Qty: 120 | Refills: 0 | OUTPATIENT
Start: 2018-06-05 | End: 2018-07-04

## 2018-06-05 RX ADMIN — LAMOTRIGINE 200 MILLIGRAM(S): 25 TABLET, ORALLY DISINTEGRATING ORAL at 05:35

## 2018-06-05 RX ADMIN — LEVETIRACETAM 1500 MILLIGRAM(S): 250 TABLET, FILM COATED ORAL at 05:35

## 2018-06-05 NOTE — PROGRESS NOTE ADULT - PROBLEM SELECTOR PLAN 2
- c/w keppra and lamictal at home doses  - neuro recs appreciated
- c/w keppra and lamictal at home doses  - neuro recs appreciated
- c/w keppra and lamictal at home doses  - neuro recs appreciated  -Await EEG
- c/w lamictal at home dose and will increase keppra to 1500BID  - neuro recs appreciated

## 2018-06-05 NOTE — PROGRESS NOTE ADULT - SUBJECTIVE AND OBJECTIVE BOX
Patient is a 22y old  Male who presents with a chief complaint of Seizures at home (02 Jun 2018 04:14)      SUBJECTIVE / OVERNIGHT EVENTS: No overnight events. Patient unable to provide history due to cerebral palsy. Father at bedside. He reports that patient is doing well.     MEDICATIONS  (STANDING):  famotidine    Tablet 20 milliGRAM(s) Oral daily  lamoTRIgine 200 milliGRAM(s) Oral two times a day  levETIRAcetam 1000 milliGRAM(s) Oral <User Schedule>  levETIRAcetam 1500 milliGRAM(s) Oral <User Schedule>    MEDICATIONS  (PRN):  LORazepam   Injectable 2 milliGRAM(s) IV Push every 6 hours PRN Seizure activity    Vital Signs Last 24 Hrs  T(C): 36.6 (05 Jun 2018 05:33), Max: 36.8 (04 Jun 2018 14:21)  T(F): 97.9 (05 Jun 2018 05:33), Max: 98.2 (04 Jun 2018 14:21)  HR: 93 (05 Jun 2018 05:33) (51 - 103)  BP: 97/67 (05 Jun 2018 05:33) (95/59 - 101/63)  BP(mean): --  RR: 18 (05 Jun 2018 05:33) (18 - 18)  SpO2: 96% (05 Jun 2018 05:33) (95% - 98%)    PHYSICAL EXAM:  GENERAL: NAD, well-developed  HEAD:  Atraumatic,   EYES: EOMI, PERRLA, conjunctiva and sclera clear  NECK: Supple, No JVD  CHEST/LUNG: Non labored   HEART: Regular rate and rhythm  ABDOMEN: Soft, Nontender, Nondistended;  EXTREMITIES: LE Contracted  PSYCH: awake  NEUROLOGY: non-focal  SKIN: No rashes or lesions    LABS:  Complete Blood Count (06.02.18 @ 06:45)    WBC Count: 9.06 K/uL    RBC Count: 4.94 M/uL    Hemoglobin: 15.2 g/dL    Hematocrit: 46.2 %    Mean Cell Volume: 93.5 fL    Mean Cell Hemoglobin: 30.8 pg    Mean Cell Hemoglobin Conc: 32.9 %    Red Cell Distrib Width: 12.0 %    Platelet Count - Automated: 192 K/uL    MPV: 9.6 fl    Nucleated RBC #: 0    Blood Gas Venous Comprehensive (06.01.18 @ 21:55)    Blood Gas Venous - Lactate: 1.1: Please note updated reference range. mmol/L    Blood Gas Venous - Chloride: 104 mmol/L    Blood Gas Venous - Creatinine: 0.53 mg/dL    pH, Venous: 7.30 pH    pCO2, Venous: 73 mmHg    pO2, Venous: 27 mmHg    HCO3, Venous: 28 mmol/L    Base Excess, Venous: 8.8: REFERENCE RANGE = -3 + 2 mmol/L mmol/L    Oxygen Saturation, Venous: 41.6 %    Blood Gas Venous - Sodium: 139 mmol/L    Blood Gas Venous - Potassium: 4.1 mmol/L    Blood Gas Venous - Glucose: 89    Blood Gas Venous - Hemoglobin: 16.1 g/dL    Blood Gas Venous - Hematocrit: 49.4 %    Culture - Blood (06.02.18 @ 07:41)    Culture - Blood:   NO ORGANISMS ISOLATED  NO ORGANISMS ISOLATED AT 48 HRS.    Specimen Source: BLOOD VENOUS    RADIOLOGY & ADDITIONAL TESTS:    Imaging Personally Reviewed:     Consultant(s) Notes Reviewed:      Care Discussed with Consultants/Other Providers:    Assessment and Plan:
Patient is a 22y old  Male who presents with a chief complaint of Seizures at home (02 Jun 2018 04:14)      SUBJECTIVE / OVERNIGHT EVENTS: No overnight events. Patient unable to provide history due to cerebral palsy. Father at bedside. Reports some unusual eye movements yesterday however none since then. Denies skipping/missing meds, denies any new changes to meds and no identifiable infectious symptoms. Per father, patient is doing well.     MEDICATIONS  (STANDING):  famotidine    Tablet 20 milliGRAM(s) Oral daily  lamoTRIgine 200 milliGRAM(s) Oral two times a day  levETIRAcetam 1000 milliGRAM(s) Oral <User Schedule>  levETIRAcetam 1500 milliGRAM(s) Oral <User Schedule>    MEDICATIONS  (PRN):  LORazepam   Injectable 2 milliGRAM(s) IV Push every 6 hours PRN Seizure activity    Vital Signs Last 24 Hrs  T(C): 36.5 (04 Jun 2018 05:48), Max: 36.5 (04 Jun 2018 05:48)  T(F): 97.7 (04 Jun 2018 05:48), Max: 97.7 (04 Jun 2018 05:48)  HR: 89 (04 Jun 2018 05:48) (81 - 89)  BP: 99/52 (04 Jun 2018 05:48) (99/52 - 110/62)  BP(mean): --  RR: 18 (04 Jun 2018 05:48) (16 - 18)  SpO2: 98% (04 Jun 2018 05:48) (98% - 99%)    PHYSICAL EXAM:  GENERAL: NAD, well-developed  HEAD:  Atraumatic,   EYES: EOMI, PERRLA, conjunctiva and sclera clear  NECK: Supple, No JVD  CHEST/LUNG: Non labored   HEART: Regular rate and rhythm  ABDOMEN: Soft, Nontender, Nondistended;  EXTREMITIES: LE Contracted  PSYCH: awake  NEUROLOGY: non-focal  SKIN: No rashes or lesions    LABS:  Complete Blood Count (06.02.18 @ 06:45)    WBC Count: 9.06 K/uL    RBC Count: 4.94 M/uL    Hemoglobin: 15.2 g/dL    Hematocrit: 46.2 %    Mean Cell Volume: 93.5 fL    Mean Cell Hemoglobin: 30.8 pg    Mean Cell Hemoglobin Conc: 32.9 %    Red Cell Distrib Width: 12.0 %    Platelet Count - Automated: 192 K/uL    MPV: 9.6 fl    Nucleated RBC #: 0    Blood Gas Venous Comprehensive (06.01.18 @ 21:55)    Blood Gas Venous - Lactate: 1.1: Please note updated reference range. mmol/L    Blood Gas Venous - Chloride: 104 mmol/L    Blood Gas Venous - Creatinine: 0.53 mg/dL    pH, Venous: 7.30 pH    pCO2, Venous: 73 mmHg    pO2, Venous: 27 mmHg    HCO3, Venous: 28 mmol/L    Base Excess, Venous: 8.8: REFERENCE RANGE = -3 + 2 mmol/L mmol/L    Oxygen Saturation, Venous: 41.6 %    Blood Gas Venous - Sodium: 139 mmol/L    Blood Gas Venous - Potassium: 4.1 mmol/L    Blood Gas Venous - Glucose: 89    Blood Gas Venous - Hemoglobin: 16.1 g/dL    Blood Gas Venous - Hematocrit: 49.4 %    Culture - Blood (06.02.18 @ 07:41)    Culture - Blood:   NO ORGANISMS ISOLATED  NO ORGANISMS ISOLATED AT 48 HRS.    Specimen Source: BLOOD VENOUS    RADIOLOGY & ADDITIONAL TESTS:    Imaging Personally Reviewed:     Consultant(s) Notes Reviewed:      Care Discussed with Consultants/Other Providers:    Assessment and Plan:
Patient is a 22y old  Male who presents with a chief complaint of Seizures at home (02 Jun 2018 04:14)      SUBJECTIVE / OVERNIGHT EVENTS: patient seen and examined by bedside, no apparent distress noted, patient being fed  by mom, no seizures at this time. Pt non verbal       MEDICATIONS  (STANDING):  famotidine    Tablet 20 milliGRAM(s) Oral daily  lamoTRIgine 200 milliGRAM(s) Oral two times a day  levETIRAcetam 1000 milliGRAM(s) Oral <User Schedule>  levETIRAcetam 1500 milliGRAM(s) Oral <User Schedule>    MEDICATIONS  (PRN):  LORazepam   Injectable 2 milliGRAM(s) IV Push every 6 hours PRN Seizure activity      Vital Signs Last 24 Hrs  T(C): 36.4 (02 Jun 2018 12:45), Max: 37.8 (02 Jun 2018 02:05)  T(F): 97.6 (02 Jun 2018 12:45), Max: 100.1 (02 Jun 2018 02:05)  HR: 85 (02 Jun 2018 12:45) (85 - 108)  BP: 115/65 (02 Jun 2018 12:45) (105/72 - 115/83)  BP(mean): --  RR: 16 (02 Jun 2018 12:45) (14 - 16)  SpO2: 98% (02 Jun 2018 12:45) (97% - 100%)  CAPILLARY BLOOD GLUCOSE      POCT Blood Glucose.: 98 mg/dL (01 Jun 2018 21:29)    I&O's Summary      PHYSICAL EXAM:  GENERAL: NAD, well-developed  HEAD:  Atraumatic, Normocephalic  EYES: EOMI, PERRLA, conjunctiva and sclera clear  NECK: Supple,   CHEST/LUNG: Clear to auscultation bilaterally; No wheeze  HEART: Regular rate and rhythm;  ABDOMEN: Soft, Nontender, Nondistended; Bowel sounds present  EXTREMITIES:  2+ Peripheral Pulses, b/l foot drop  PSYCH: calm  NEUROLOGY: nonverbal   SKIN: No rashes or lesions    LABS:                        15.2   9.06  )-----------( 192      ( 02 Jun 2018 06:45 )             46.2     06-02    142  |  99  |  12  ----------------------------<  91  4.0   |  28  |  0.61    Ca    9.1      02 Jun 2018 06:45  Phos  4.1     06-02  Mg     2.3     06-02    TPro  7.1  /  Alb  4.8  /  TBili  0.2  /  DBili  x   /  AST  14  /  ALT  13  /  AlkPhos  76  06-01              RADIOLOGY & ADDITIONAL TESTS:    Imaging Personally Reviewed:    Consultant(s) Notes Reviewed:  neurology     Care Discussed with Consultants/Other Providers: neurology (Katie )
Neurology Progress    IRINA [id number]    HPI:  Please note that the patient is non-verbal at baseline, HPI and some ROS obtained from the mother    This is a 22M with history of Fragile X, Cerebral Palsy and Seizure d/o who is brought to the hospital by his mother because of 2 focal seizures at home. The mother said that she had noted that the patient's head was turned to the left with his eyes also turned to left and was not responding in his usual manner. She gave him a dose of diazepam with improvement in his symptoms but he soon started to exhibit similar seizure like activity again about 20 minutes later and she gave him another dose of diazepam and she then brought the patient to the hospital for evaluation of his seizures. Said that the patient usually is well controlled on hid AEDs and his last seizure episode was about 6 months ago, said that if his seizures occur they usually resolve with the diazepam and don't reoccur that close to each other. She denies any fevers or diaphoresis for the patient but said that the patient seemed to be having chills in the ED. She denied any changes in the patient's diet or medications. Said that the patient was on tetracycline for acne recently but had finished that medication about 1 month ago. No other complaints.     In the ED, his vitals were T 98.6, Tmax 100.1, P 108, /83, R 16, O2 sat 98% RA. His lab work did not show any acute abnormalities. His CXR showed clear lungs. Patient's mother noted that the patient had another 2 episodes of focal seizures in the ED but they lasted for less than 2 minutes each. He was given keppra 1500mg IVPB x1. He was evaluated by neurology and was admitted to medicine. (2018 04:14)      Past Medical History  Seizure disorder  Fragile X syndrome  Cerebral palsy      Past Surgical History  Bilateral hip dislocation, subsequent encounter  Adolescent idiopathic scoliosis      MEDICATIONS    famotidine    Tablet 20 milliGRAM(s) Oral daily  lamoTRIgine 200 milliGRAM(s) Oral two times a day  levETIRAcetam 1500 milliGRAM(s) Oral <User Schedule>  levETIRAcetam 1500 milliGRAM(s) Oral <User Schedule>  LORazepam   Injectable 2 milliGRAM(s) IV Push every 6 hours PRN         Family history: No history of dementia, strokes, or seizures   FAMILY HISTORY:  Family history of hypertension (Mother): in Mother    SOCIAL HISTORY -- No history of tobacco or alcohol use     Allergies    No Known Allergies    Intolerances            Vital Signs Last 24 Hrs  T(C): 36.3 (2018 19:09), Max: 36.8 (2018 14:21)  T(F): 97.4 (2018 19:09), Max: 98.2 (2018 14:21)  HR: 86 (2018 21:34) (51 - 103)  BP: 95/59 (2018 19:09) (95/59 - 110/62)  BP(mean): --  RR: 18 (2018 19:09) (18 - 18)  SpO2: 95% (2018 19:09) (95% - 98%)        On Neurological Examination:    Mental Status - Patient is alert, non verbal smiling   Follows no  commands                             Cranial Nerves - Extraocular muscle intact  DARRIN Facial symmetry Tongue midline, CnV1to V3 intact gross hearing intact      Motor Exam -   Right upper  4/5 throughout  Left upper 4/5 throughout  Right lower- 3/5 throughout  Left lower35/5 throughout    Muscle tone - increase spastic b/l le>ue      Sensory    Bilateral intact to light touch    GENERAL Exam:     Nontoxic , No Acute Distress   	  HEENT:  normocephalic, atraumatic  		  LUNGS:	Clear bilaterally  No Wheeze      VASCULAR: no carotid brui  	  HEART:	 Normal S1S2   No murmur RRR        	  MUSCULOSKELETAL: Normal Range of Motion  	   SKIN:      Normal   No Ecchymosis               LABS:    Urinalysis Basic - ( 2018 14:50 )    Color: YELLOW / Appearance: HAZY / S.025 / pH: 6.0  Gluc: NEGATIVE / Ketone: NEGATIVE  / Bili: NEGATIVE / Urobili: NORMAL mg/dL   Blood: NEGATIVE / Protein: 20 mg/dL / Nitrite: NEGATIVE   Leuk Esterase: NEGATIVE / RBC: 5-10 / WBC 2-5   Sq Epi: OCC / Non Sq Epi: x / Bacteria: FEW            Hemoglobin A1C:       Vitamin B12         RADIOLOGY    EKG                [facility] 
Patient is a 22y old  Male who presents with a chief complaint of Seizures at home (02 Jun 2018 04:14)      SUBJECTIVE / OVERNIGHT EVENTS: patient seen and examined by bedside at 12:10 PM, no cute events overnight, no further SZ       MEDICATIONS  (STANDING):  famotidine    Tablet 20 milliGRAM(s) Oral daily  lamoTRIgine 200 milliGRAM(s) Oral two times a day  levETIRAcetam 1000 milliGRAM(s) Oral <User Schedule>  levETIRAcetam 1500 milliGRAM(s) Oral <User Schedule>    MEDICATIONS  (PRN):  LORazepam   Injectable 2 milliGRAM(s) IV Push every 6 hours PRN Seizure activity      Vital Signs Last 24 Hrs  T(C): 36.1 (03 Jun 2018 14:46), Max: 36.8 (02 Jun 2018 21:17)  T(F): 97 (03 Jun 2018 14:46), Max: 98.3 (02 Jun 2018 21:17)  HR: 88 (03 Jun 2018 14:46) (77 - 88)  BP: 101/60 (03 Jun 2018 14:46) (97/59 - 105/66)  BP(mean): --  RR: 16 (03 Jun 2018 14:46) (16 - 18)  SpO2: 99% (03 Jun 2018 14:46) (98% - 100%)  CAPILLARY BLOOD GLUCOSE        I&O's Summary        PHYSICAL EXAM:  GENERAL: NAD, well-developed  HEAD:  Atraumatic, Normocephalic  EYES: EOMI, PERRLA, conjunctiva and sclera clear  NECK: Supple,   CHEST/LUNG: Clear to auscultation bilaterally; No wheeze  HEART: Regular rate and rhythm;  ABDOMEN: Soft, Nontender, Nondistended; Bowel sounds present  EXTREMITIES:  2+ Peripheral Pulses, b/l foot drop  PSYCH: calm  NEUROLOGY: nonverbal   SKIN: No rashes or lesions    LABS:                        15.2   9.06  )-----------( 192      ( 02 Jun 2018 06:45 )             46.2     06-02    142  |  99  |  12  ----------------------------<  91  4.0   |  28  |  0.61    Ca    9.1      02 Jun 2018 06:45  Phos  4.1     06-02  Mg     2.3     06-02    TPro  7.1  /  Alb  4.8  /  TBili  0.2  /  DBili  x   /  AST  14  /  ALT  13  /  AlkPhos  76  06-01              RADIOLOGY & ADDITIONAL TESTS:    Imaging Personally Reviewed:    Consultant(s) Notes Reviewed:      Care Discussed with Consultants/Other Providers:

## 2018-06-05 NOTE — PROGRESS NOTE ADULT - ATTENDING COMMENTS
plan of care d/w father at bedside   Pt has [facility] for 30 hrs/week
DC home if EEG and UA unremarkable. Plan discussed with father at bedside. Time spent 35 mins
DC home today. Time spent 35mins
plan of care d/w mother at bedside   Pt has [facility] for 30 hrs/week

## 2018-06-05 NOTE — PROGRESS NOTE ADULT - ASSESSMENT
This is a young male with four seizures.  Would increase the Keppra to 1500 BID for now since it will act quickly.  In future  Lamictal can be increased     Keppra 1500 BID     discussed with father    To follow up with Dr. Moreno
22 yr old Male  with history of Fragile X, Cerebral Palsy, and Seizure d/o  presented  from home with increased seizure activity.

## 2018-06-05 NOTE — PROGRESS NOTE ADULT - PROBLEM SELECTOR PLAN 1
- Unclear cause of patient's increased seizure activity, no recent changes in medications (tetracycline was ~1 month ago as per mother), no changes in diet, he is noted to have a temp of 100.1 in ED and the mother said that he looked like he had chills but she denies any other infectious complaints  - For now, will check BCx and UCx, will monitor for further fevers and reassess if they occur  - Neurology eval appreciated, will obtain EEG, will c/w home keppra and lamictal, ativan prn seizure episodes  case d/w neuro ,will check lamictal level
-Unclear cause of patient's increased seizure activity at this time.   -No recent changes in medications and per family patient did not miss any doses  -He was noted to have a temp of 100.1 in ED and the mother said that he looked like he had chills but she denies any other infectious complaints  - Blood culture and cxr unremarkable. UA unremarkable as well  - Neurology eval appreciated. Per discussion with Dr. Schoenberg, will increase keppra to 1500BID.
-Unclear cause of patient's increased seizure activity at this time.   -No recent changes in medications and per family patient did not miss any doses  -He was noted to have a temp of 100.1 in ED and the mother said that he looked like he had chills but she denies any other infectious complaints  - Blood culture and cxr unremarkable. Urine culture and UA pending.   - Neurology eval appreciated, will obtain EEG  -For now no changes in home medications per neuro recommendations.
- Unclear cause of patient's increased seizure activity, no recent changes in medications (tetracycline was ~1 month ago as per mother), no changes in diet, he is noted to have a temp of 100.1 in ED and the mother said that he looked like he had chills but she denies any other infectious complaints  - For now, will check BCx and UCx, will monitor for further fevers and reassess if they occur  - Neurology eval appreciated, will obtain EEG, will c/w home keppra and lamictal, ativan prn for seizure episodes  ,will check lamictal level

## 2018-06-05 NOTE — PROGRESS NOTE ADULT - PROBLEM SELECTOR PLAN 3
- SCDs for DVT ppx, IMPROVE score 0.6%  - Fall and aspiration precautions

## 2018-06-07 ENCOUNTER — OTHER (OUTPATIENT)
Age: 23
End: 2018-06-07

## 2018-06-07 LAB
BACTERIA BLD CULT: SIGNIFICANT CHANGE UP
BACTERIA BLD CULT: SIGNIFICANT CHANGE UP

## 2018-06-25 ENCOUNTER — APPOINTMENT (OUTPATIENT)
Dept: NEUROLOGY | Facility: HOSPITAL | Age: 23
End: 2018-06-25

## 2018-07-13 ENCOUNTER — RX RENEWAL (OUTPATIENT)
Age: 23
End: 2018-07-13

## 2018-07-13 ENCOUNTER — MEDICATION RENEWAL (OUTPATIENT)
Age: 23
End: 2018-07-13

## 2018-07-16 ENCOUNTER — RX RENEWAL (OUTPATIENT)
Age: 23
End: 2018-07-16

## 2018-08-01 ENCOUNTER — RX RENEWAL (OUTPATIENT)
Age: 23
End: 2018-08-01

## 2018-09-17 ENCOUNTER — APPOINTMENT (OUTPATIENT)
Dept: NEUROLOGY | Facility: HOSPITAL | Age: 23
End: 2018-09-17

## 2018-09-17 ENCOUNTER — OUTPATIENT (OUTPATIENT)
Dept: OUTPATIENT SERVICES | Facility: HOSPITAL | Age: 23
LOS: 1 days | End: 2018-09-17

## 2018-09-17 VITALS
DIASTOLIC BLOOD PRESSURE: 76 MMHG | HEART RATE: 88 BPM | BODY MASS INDEX: 21.26 KG/M2 | WEIGHT: 120 LBS | HEIGHT: 63 IN | SYSTOLIC BLOOD PRESSURE: 108 MMHG

## 2018-09-17 DIAGNOSIS — M41.129 ADOLESCENT IDIOPATHIC SCOLIOSIS, SITE UNSPECIFIED: Chronic | ICD-10-CM

## 2018-09-17 DIAGNOSIS — S73.005D UNSPECIFIED DISLOCATION OF LEFT HIP, SUBSEQUENT ENCOUNTER: Chronic | ICD-10-CM

## 2018-09-18 DIAGNOSIS — G40.909 EPILEPSY, UNSPECIFIED, NOT INTRACTABLE, WITHOUT STATUS EPILEPTICUS: ICD-10-CM

## 2018-10-15 ENCOUNTER — APPOINTMENT (OUTPATIENT)
Dept: PEDIATRIC ADOLESCENT MEDICINE | Facility: HOSPITAL | Age: 23
End: 2018-10-15
Payer: MEDICARE

## 2018-10-15 ENCOUNTER — OUTPATIENT (OUTPATIENT)
Dept: OUTPATIENT SERVICES | Age: 23
LOS: 1 days | End: 2018-10-15

## 2018-10-15 VITALS — DIASTOLIC BLOOD PRESSURE: 70 MMHG | SYSTOLIC BLOOD PRESSURE: 102 MMHG | HEART RATE: 85 BPM

## 2018-10-15 DIAGNOSIS — Z23 ENCOUNTER FOR IMMUNIZATION: ICD-10-CM

## 2018-10-15 DIAGNOSIS — Z83.1 FAMILY HISTORY OF OTHER INFECTIOUS AND PARASITIC DISEASES: ICD-10-CM

## 2018-10-15 DIAGNOSIS — Z87.09 PERSONAL HISTORY OF OTHER DISEASES OF THE RESPIRATORY SYSTEM: ICD-10-CM

## 2018-10-15 DIAGNOSIS — R50.9 FEVER, UNSPECIFIED: ICD-10-CM

## 2018-10-15 DIAGNOSIS — Z00.00 ENCOUNTER FOR GENERAL ADULT MEDICAL EXAMINATION W/OUT ABNORMAL FINDINGS: ICD-10-CM

## 2018-10-15 DIAGNOSIS — M41.129 ADOLESCENT IDIOPATHIC SCOLIOSIS, SITE UNSPECIFIED: Chronic | ICD-10-CM

## 2018-10-15 DIAGNOSIS — S73.005D UNSPECIFIED DISLOCATION OF LEFT HIP, SUBSEQUENT ENCOUNTER: Chronic | ICD-10-CM

## 2018-10-15 DIAGNOSIS — Z00.00 ENCOUNTER FOR GENERAL ADULT MEDICAL EXAMINATION WITHOUT ABNORMAL FINDINGS: ICD-10-CM

## 2018-10-15 DIAGNOSIS — S30.813A: ICD-10-CM

## 2018-10-15 PROCEDURE — 99395 PREV VISIT EST AGE 18-39: CPT

## 2018-10-16 NOTE — HISTORY OF PRESENT ILLNESS
[Father] : father [Brushes ___ Times Daily] : brushes [unfilled] times daily [Needs Immunization] : Needs immunizations [Acute Illness] : no illness since last visit [de-identified] : Has dental appt Nov 05. [de-identified] : needs flu vaccine [FreeTextEntry1] : Mustapha is a 24 yo young man with Fragile X syndrome and seizure disorder.  He is wheelchair bound and nonverbal.  He presents today for wellness exam.  He has recently been well with no acute illness.\par \par Dental visit scheduled for Nov 05.  Parents brush his teeth once daily.\gus poole Currently attends [employer] school 5 days a week.\gus Receives OT and PT.  Aide comes to home 5 days a week to assist.\par \par Parents have guardianship.\par \par He lives at home with both parents.  He is non-ambulatory and non-verbal.  He is able to eat food of normal consistency and is a good eater.  He uses diapers for urination/stooling. \gus poole Has focal seizures- 2 in the past 6 weeks.  Recent med change from lamictal to oxcarbazepine.  Seizures resolve with diastat at home.  He follows with adult neurology.\par

## 2018-10-16 NOTE — REVIEW OF SYSTEMS
[Nl] : Constitutional [Seizure] : seizures [Eye Discharge] : no eye discharge [Cough] : no cough [Shortness of Breath] : no shortness of breath [Change in Appetite] : no change in appetite [Vomiting] : no vomiting [Diarrhea] : no diarrhea [Constipation] : no constipation [Rash] : no rash [Skin Lesions] : no skin lesions [Bruising] : no tendency for easy bruising [Sleep Disturbances] : ~T no sleep disturbances [Dec Urine Output] : no oliguria [Urinary Frequency] : no change in urinary frequency

## 2018-10-16 NOTE — PHYSICAL EXAM
[General Appearance - Alert] : alert [General Appearance - In No Acute Distress] : in no acute distress [General Appearance - Well Nourished] : well nourished [Sclera] : the conjunctiva were normal [Outer Ear] : the ears and nose were normal in appearance [Both Tympanic Membranes Were Examined] : both tympanic membranes were normal [Nasal Cavity] : the nasal mucosa and septum were normal [Examination Of The Oral Cavity] : the teeth, gums, and palate were normal [Oropharynx] : the oropharynx was normal  [Neck Cervical Mass (___cm)] : no neck mass was observed [Respiration, Rhythm And Depth] : normal respiratory rhythm and effort [Auscultation Breath Sounds / Voice Sounds] : clear bilateral breath sounds [Heart Rate And Rhythm] : heart rate and rhythm were normal [Heart Sounds] : normal S1 and S2 [Murmurs] : no murmurs [Bowel Sounds] : normal bowel sounds [Abdomen Soft] : soft [Abdomen Tenderness] : non-tender [Abdominal Distention] : nondistended [] : no significant rash [Penis Abnormality] : the penis was normal [Scrotum] : the scrotum was normal [Testes Mass (___cm)] : there were no testicular masses [FreeTextEntry1] : nonverbal but alert

## 2018-10-16 NOTE — DISCUSSION/SUMMARY
[FreeTextEntry1] : 22 yo with Fragile X and non-ambulatory/nonverbal here for physical.  He has been doing well.  Seizures controlled on medications and diastat as needed.  Follows with adult neurology.  \par \par Discussed with father brushing Mustapha's teeth twice per day rather than once.  Has dental appt in Nov.\par \par Flu shot given.\par \par CBC and lipids today.\par \par Phone numbers provided for adult medicine as Mustapha will be transitioning since he is 23 years old.

## 2018-10-17 LAB
BASOPHILS # BLD AUTO: 0.04 K/UL
BASOPHILS NFR BLD AUTO: 0.5 %
CHOLEST SERPL-MCNC: 169 MG/DL
CHOLEST/HDLC SERPL: 3.5 RATIO
EOSINOPHIL # BLD AUTO: 0.07 K/UL
EOSINOPHIL NFR BLD AUTO: 0.9 %
HCT VFR BLD CALC: 47.7 %
HDLC SERPL-MCNC: 48 MG/DL
HGB BLD-MCNC: 16.5 G/DL
IMM GRANULOCYTES NFR BLD AUTO: 0.3 %
LDLC SERPL CALC-MCNC: 103 MG/DL
LYMPHOCYTES # BLD AUTO: 3.36 K/UL
LYMPHOCYTES NFR BLD AUTO: 43.5 %
MAN DIFF?: NORMAL
MCHC RBC-ENTMCNC: 32.4 PG
MCHC RBC-ENTMCNC: 34.6 GM/DL
MCV RBC AUTO: 93.7 FL
MONOCYTES # BLD AUTO: 0.39 K/UL
MONOCYTES NFR BLD AUTO: 5.1 %
NEUTROPHILS # BLD AUTO: 3.84 K/UL
NEUTROPHILS NFR BLD AUTO: 49.7 %
PLATELET # BLD AUTO: 193 K/UL
RBC # BLD: 5.09 M/UL
RBC # FLD: 13.8 %
TRIGL SERPL-MCNC: 91 MG/DL
WBC # FLD AUTO: 7.72 K/UL

## 2018-12-20 ENCOUNTER — RX RENEWAL (OUTPATIENT)
Age: 23
End: 2018-12-20

## 2019-01-30 ENCOUNTER — RX RENEWAL (OUTPATIENT)
Age: 24
End: 2019-01-30

## 2019-01-31 ENCOUNTER — RX RENEWAL (OUTPATIENT)
Age: 24
End: 2019-01-31

## 2019-02-07 ENCOUNTER — APPOINTMENT (OUTPATIENT)
Dept: NEUROLOGY | Facility: HOSPITAL | Age: 24
End: 2019-02-07

## 2019-03-12 ENCOUNTER — RX RENEWAL (OUTPATIENT)
Age: 24
End: 2019-03-12

## 2019-05-14 ENCOUNTER — RX RENEWAL (OUTPATIENT)
Age: 24
End: 2019-05-14

## 2019-06-03 ENCOUNTER — RX RENEWAL (OUTPATIENT)
Age: 24
End: 2019-06-03

## 2019-07-10 NOTE — PATIENT PROFILE ADULT. - URINARY CATHETER
[FreeTextEntry1] : trial nasal spray. for allergies\par Labs drawn/ specimens obtained  in office on this date of service  for evaluation of   assessed conditions -  lipid    to be run at [facility].\par  As per usual protocol the patient was advised in regards to the risks of driving when on medications with side effects of dizziness or drowsiness. Patient has been assessed  for increase risk for respiratory depression. Patient denies suicidal ideations or plan.  Istop checked.\par  no

## 2019-07-22 RX ORDER — LEVETIRACETAM 500 MG/1
500 TABLET, FILM COATED ORAL TWICE DAILY
Qty: 60 | Refills: 2 | Status: DISCONTINUED | COMMUNITY
Start: 2018-09-17 | End: 2019-07-22

## 2019-07-23 ENCOUNTER — RX RENEWAL (OUTPATIENT)
Age: 24
End: 2019-07-23

## 2019-07-25 ENCOUNTER — RX RENEWAL (OUTPATIENT)
Age: 24
End: 2019-07-25

## 2019-08-29 ENCOUNTER — APPOINTMENT (OUTPATIENT)
Dept: NEUROLOGY | Facility: HOSPITAL | Age: 24
End: 2019-08-29

## 2019-08-29 ENCOUNTER — OUTPATIENT (OUTPATIENT)
Dept: OUTPATIENT SERVICES | Facility: HOSPITAL | Age: 24
LOS: 1 days | End: 2019-08-29
Payer: MEDICARE

## 2019-08-29 VITALS
HEIGHT: 63 IN | WEIGHT: 130 LBS | SYSTOLIC BLOOD PRESSURE: 106 MMHG | DIASTOLIC BLOOD PRESSURE: 75 MMHG | BODY MASS INDEX: 23.04 KG/M2 | HEART RATE: 72 BPM | RESPIRATION RATE: 14 BRPM

## 2019-08-29 DIAGNOSIS — M41.129 ADOLESCENT IDIOPATHIC SCOLIOSIS, SITE UNSPECIFIED: Chronic | ICD-10-CM

## 2019-08-29 DIAGNOSIS — R56.9 UNSPECIFIED CONVULSIONS: ICD-10-CM

## 2019-08-29 DIAGNOSIS — S73.005D UNSPECIFIED DISLOCATION OF LEFT HIP, SUBSEQUENT ENCOUNTER: Chronic | ICD-10-CM

## 2019-08-29 PROCEDURE — G0463: CPT

## 2019-08-29 RX ORDER — OXCARBAZEPINE 150 MG/1
150 TABLET, FILM COATED ORAL TWICE DAILY
Qty: 180 | Refills: 3 | Status: DISCONTINUED | COMMUNITY
Start: 2018-09-17 | End: 2019-08-29

## 2019-08-29 RX ORDER — CLONAZEPAM 0.5 MG/1
0.5 TABLET, ORALLY DISINTEGRATING ORAL
Qty: 10 | Refills: 0 | Status: ACTIVE | COMMUNITY
Start: 2019-08-29 | End: 1900-01-01

## 2019-09-02 NOTE — ASSESSMENT
[FreeTextEntry1] : history as above. intractable epilepsy with infrequent prolong seizures( 20 min) approaching status epilepticus.\par Will change meds to ER form but still on a BID dosing. clonazepam ODT as rescue.\par Will consider intranasal midazolam in 01/2020 and possibly adding zonisamide.

## 2019-09-02 NOTE — DISCUSSION/SUMMARY
[FreeTextEntry1] : 22 yo M with Fragile X syndrome, MR, and epilepsy, currently on Keppra 1500 BID and Lamictal 200BID. Last seizure July 2019. Last Hospitalization in Spring 2019 ([facility], breakthrough seizure 2/2 bladder infection). \par \par Plan:\par -Switch to Keppra 1500 BID Extended Release, switch to Lamictal 200mg BID Extended Release \par -start Clonazepam ODT 0.5mg PRN for prolonged seizures greater than 3 minutes, MAX 2 doses. If refractory continue with Diastat for seizure rescue. \par -Labs: CBC, CMP, Lamictal, Keppra Serum Levels \par -f/u in 5 months \par -please contact clinic in case patient is hospitalized. \par \par Plan discussed w/ attending.

## 2019-09-02 NOTE — PHYSICAL EXAM
[General Appearance - In No Acute Distress] : in no acute distress [General Appearance - Alert] : alert [General Appearance - Well Developed] : well developed [General Appearance - Well Nourished] : well nourished [General Appearance - Well-Appearing] : healthy appearing [Sclera] : the sclera and conjunctiva were normal [PERRL With Normal Accommodation] : pupils were equal in size, round, reactive to light, with normal accommodation [Extraocular Movements] : extraocular movements were intact [] : no respiratory distress [Neck Appearance] : the appearance of the neck was normal [Exaggerated Use Of Accessory Muscles For Inspiration] : no accessory muscle use [Skin Color & Pigmentation] : normal skin color and pigmentation [FreeTextEntry1] : General: Sitting in wheelchair in no acute distress\par Neuro: MS: Awake and alert, non-verbal, not following simple commands\par CN: PERRL, EOMI, no 7th nerve palsy, tongue midline\par Motor: Increased tone throughout. b/l UE's antigravity, internally rotated/contracted. minimal movements of the b/l LE\par Reflexes: 3+ DTR's throughout\par Gait: WC bound

## 2019-09-02 NOTE — HISTORY OF PRESENT ILLNESS
[FreeTextEntry1] : F/u 8/29/19: 22 yo man who presents to seizure clinic since September 2018 for hx of epilepsy in setting of Fragile X Syndrome w/ intellectual disability. Patient's current AED regimen includes Keppra 1500mg BID and Lamictal 200mg BID. Patient had been started on transition from Lamictal to Oxcarb, however due to side effects, was subsequently stopped. Patient's last seizure was 7/2019, classified as small seizure (head turning to the right, clenched right hand, staring spells, patient post-ictal for 20 mins, however did not require Diastat for seizure rescue). ROS otherwise unremarkable for any generalized, pain, LOC, SOB, N/V, bowel/bladder loss, new focal weakness. Patient is wheel chair-bound. Patient also needs refills.  \par \par \par 19 yo M with Fragile X syndrome, MR, and epilepsy. Had 1 seizures in 2018- latest one 3 weeks ago consisting of left head turn and eye deviation with stiffening of extremities for 15 minutes. Hospitalized in July for Seizures. Keppra dose increased during hospital admission. On Keppra 1500 BIDand Lamictal 200BID.\par \par Plan:\par -Conitnue with Keppra 1500 BID\par -Start Oxcarbazpeine 150 BID (titrate upto 450mg) (will go upto 600 BID at next visit if patient is stable)\par -Titrate Lamicatal down until off. (email sent to father with titration schedule)\par -F/u 6-8 weeks at [address] [place] [address]\par -Discussed that [facility] clinic will be closing in October. Gave follow up information for [address] [place] [address] neurology clinic for future epilepsy appointments.\par -Sent medication Schedule to Fathers email.\par -Case and plan was d/w Dr. Trevizo \par ________\par 9/17/2018- Patient presents today for follow up. He did not make it to his May appointment because of transportation issues. Had an EMU admission where no seizures were recorded. His father states that he had a seizure in August and was hospitalized for 4 days where they increased his Keppra dose to 1500 BID. 3 weeks ago he had another seizure which his father described as eyes deviated to the left along with his head and stiffening of his extremities. After the first 5 minutes, he was given diastat. The seizure continued for another 10 minutes after that with a total of 15 minutes. He was then confused and was back to his baseline the following morning. \par \par 11/13/2017- Mother reports that patient has twitching episodes once every 6-8 months. Last episode was about 4 months ago where he had facial twitching for a few minutes and was given diastat, no seizures after that. They would like an EMU admission to see if he is having subclinical seizures. No adverse effects from the medication regimen. Continues to take Keppra 1000/1500mg and Lamictal 200mg BID.\par \par 5/8/17:\par Since patient's last appointment in 11/2016, he has not had any further blinking episodes, staring and facial twitching.  Father reports that he did not schedule EMU admission until he and wife acquired guardianship in order to stay with patient in hospital, but now that they acquired it, patient no longer having the episodes.  Patient continues to take Keppra 1000/1500mg and Lamictal 200mg BID, tolerating well, no adverse effects. No seizures since last appointment. \par \par PMH:\par Pt is a 19 yo M with hx of Fragile X syndrome, MR and seizures since 3 years old. He has been on Keppra and Lamictal since about 8 years ago with great control of the seizures currently about once a year. He is currently on (generic medications) Keppra 1000 mg tab 2 po AM and 1.5 in the PM and Lamictal 200 mg tab, 1 po BID. The seizures typically consist of eyes rolled up and had shaking with generalized body shaking and stiffening. The events typically last about 1 minute and they give him rectal Diastat which stops the seizures promptly.

## 2019-09-03 DIAGNOSIS — G40.909 EPILEPSY, UNSPECIFIED, NOT INTRACTABLE, WITHOUT STATUS EPILEPTICUS: ICD-10-CM

## 2019-09-04 ENCOUNTER — RX RENEWAL (OUTPATIENT)
Age: 24
End: 2019-09-04

## 2019-09-30 ENCOUNTER — RX RENEWAL (OUTPATIENT)
Age: 24
End: 2019-09-30

## 2020-02-06 ENCOUNTER — OUTPATIENT (OUTPATIENT)
Dept: OUTPATIENT SERVICES | Facility: HOSPITAL | Age: 25
LOS: 1 days | End: 2020-02-06
Payer: MEDICARE

## 2020-02-06 ENCOUNTER — APPOINTMENT (OUTPATIENT)
Dept: NEUROLOGY | Facility: HOSPITAL | Age: 25
End: 2020-02-06

## 2020-02-06 VITALS
WEIGHT: 118 LBS | RESPIRATION RATE: 12 BRPM | DIASTOLIC BLOOD PRESSURE: 73 MMHG | BODY MASS INDEX: 20.91 KG/M2 | HEART RATE: 83 BPM | HEIGHT: 63 IN | SYSTOLIC BLOOD PRESSURE: 108 MMHG

## 2020-02-06 DIAGNOSIS — R56.9 UNSPECIFIED CONVULSIONS: ICD-10-CM

## 2020-02-06 DIAGNOSIS — Q99.2 FRAGILE X CHROMOSOME: ICD-10-CM

## 2020-02-06 DIAGNOSIS — G40.909 EPILEPSY, UNSPECIFIED, NOT INTRACTABLE, WITHOUT STATUS EPILEPTICUS: ICD-10-CM

## 2020-02-06 DIAGNOSIS — M41.129 ADOLESCENT IDIOPATHIC SCOLIOSIS, SITE UNSPECIFIED: Chronic | ICD-10-CM

## 2020-02-06 DIAGNOSIS — S73.005D UNSPECIFIED DISLOCATION OF LEFT HIP, SUBSEQUENT ENCOUNTER: Chronic | ICD-10-CM

## 2020-02-06 LAB
ALBUMIN SERPL ELPH-MCNC: 4.9 G/DL — SIGNIFICANT CHANGE UP (ref 3.3–5)
ALP SERPL-CCNC: 89 U/L — SIGNIFICANT CHANGE UP (ref 40–120)
ALT FLD-CCNC: 24 U/L — SIGNIFICANT CHANGE UP (ref 10–45)
ANION GAP SERPL CALC-SCNC: 13 MMOL/L — SIGNIFICANT CHANGE UP (ref 5–17)
AST SERPL-CCNC: 16 U/L — SIGNIFICANT CHANGE UP (ref 10–40)
BASOPHILS # BLD AUTO: 0.02 K/UL — SIGNIFICANT CHANGE UP (ref 0–0.2)
BASOPHILS NFR BLD AUTO: 0.2 % — SIGNIFICANT CHANGE UP (ref 0–2)
BILIRUB SERPL-MCNC: 0.4 MG/DL — SIGNIFICANT CHANGE UP (ref 0.2–1.2)
BUN SERPL-MCNC: 11 MG/DL — SIGNIFICANT CHANGE UP (ref 7–23)
CALCIUM SERPL-MCNC: 9.7 MG/DL — SIGNIFICANT CHANGE UP (ref 8.4–10.5)
CHLORIDE SERPL-SCNC: 97 MMOL/L — SIGNIFICANT CHANGE UP (ref 96–108)
CO2 SERPL-SCNC: 31 MMOL/L — SIGNIFICANT CHANGE UP (ref 22–31)
CREAT SERPL-MCNC: 0.53 MG/DL — SIGNIFICANT CHANGE UP (ref 0.5–1.3)
EOSINOPHIL # BLD AUTO: 0.08 K/UL — SIGNIFICANT CHANGE UP (ref 0–0.5)
EOSINOPHIL NFR BLD AUTO: 1 % — SIGNIFICANT CHANGE UP (ref 0–6)
GLUCOSE SERPL-MCNC: 68 MG/DL — LOW (ref 70–99)
HCT VFR BLD CALC: 46.8 % — SIGNIFICANT CHANGE UP (ref 39–50)
HGB BLD-MCNC: 15.1 G/DL — SIGNIFICANT CHANGE UP (ref 13–17)
IMM GRANULOCYTES NFR BLD AUTO: 0.4 % — SIGNIFICANT CHANGE UP (ref 0–1.5)
LYMPHOCYTES # BLD AUTO: 2.38 K/UL — SIGNIFICANT CHANGE UP (ref 1–3.3)
LYMPHOCYTES # BLD AUTO: 28.4 % — SIGNIFICANT CHANGE UP (ref 13–44)
MCHC RBC-ENTMCNC: 30.8 PG — SIGNIFICANT CHANGE UP (ref 27–34)
MCHC RBC-ENTMCNC: 32.3 GM/DL — SIGNIFICANT CHANGE UP (ref 32–36)
MCV RBC AUTO: 95.5 FL — SIGNIFICANT CHANGE UP (ref 80–100)
MONOCYTES # BLD AUTO: 0.43 K/UL — SIGNIFICANT CHANGE UP (ref 0–0.9)
MONOCYTES NFR BLD AUTO: 5.1 % — SIGNIFICANT CHANGE UP (ref 2–14)
NEUTROPHILS # BLD AUTO: 5.45 K/UL — SIGNIFICANT CHANGE UP (ref 1.8–7.4)
NEUTROPHILS NFR BLD AUTO: 64.9 % — SIGNIFICANT CHANGE UP (ref 43–77)
PLATELET # BLD AUTO: 208 K/UL — SIGNIFICANT CHANGE UP (ref 150–400)
POTASSIUM SERPL-MCNC: 4.3 MMOL/L — SIGNIFICANT CHANGE UP (ref 3.5–5.3)
POTASSIUM SERPL-SCNC: 4.3 MMOL/L — SIGNIFICANT CHANGE UP (ref 3.5–5.3)
PROT SERPL-MCNC: 7.2 G/DL — SIGNIFICANT CHANGE UP (ref 6–8.3)
RBC # BLD: 4.9 M/UL — SIGNIFICANT CHANGE UP (ref 4.2–5.8)
RBC # FLD: 12.7 % — SIGNIFICANT CHANGE UP (ref 10.3–14.5)
SODIUM SERPL-SCNC: 141 MMOL/L — SIGNIFICANT CHANGE UP (ref 135–145)
WBC # BLD: 8.39 K/UL — SIGNIFICANT CHANGE UP (ref 3.8–10.5)
WBC # FLD AUTO: 8.39 K/UL — SIGNIFICANT CHANGE UP (ref 3.8–10.5)

## 2020-02-06 PROCEDURE — 80053 COMPREHEN METABOLIC PANEL: CPT

## 2020-02-06 PROCEDURE — 80177 DRUG SCRN QUAN LEVETIRACETAM: CPT

## 2020-02-06 PROCEDURE — G0463: CPT

## 2020-02-06 PROCEDURE — 80175 DRUG SCREEN QUAN LAMOTRIGINE: CPT

## 2020-02-06 NOTE — DISCUSSION/SUMMARY
[FreeTextEntry1] : 25 yo M with Fragile X syndrome, MR, and epilepsy, currently on Keppra ER 1500 BID and Lamictal ER 200BID. Last seizure Feb 2020. Last Hospitalization in Spring 2019 ([facility], breakthrough seizure 2/2 bladder infection). \par \par Plan:\par -c/w Keppra 1500 BID Extended Release, \par -increase to Lamictal 250mg BID Extended Release \par -Diastat for seizure rescue PRN for prolonged seizures greater than 3 minutes\par -Labs: CBC, CMP, Lamictal, Keppra Serum Levels \par -f/u in 3 months \par -please contact clinic in case patient is hospitalized. \par \par Plan discussed w/ attending.

## 2020-02-06 NOTE — HISTORY OF PRESENT ILLNESS
[FreeTextEntry1] : f/u 2/6/20: 23 yo M with Fragile X syndrome, MR, and epilepsy, currently on Keppra 1500 BID ER and Lamictal ER 200BID, Diastat PRN. Requested Labs CBC, CMP, Lamictal, Keppra not completed during the interval, do be done today. Patient had 2 seizures (11/2019, 2/2020) during the interval, both resolved after giving Diastat PRN (5 minutes). Post-ictal period lasting 20-30 minutes (confusion). ROS otherwise unremarkable for any generalized, pain, LOC, SOB, N/V, bowel/bladder loss, new focal weakness\par __________\par F/u 8/29/19: 22 yo man who presents to seizure clinic since September 2018 for hx of epilepsy in setting of Fragile X Syndrome w/ intellectual disability. Patient's current AED regimen includes Keppra 1500mg BID and Lamictal 200mg BID. Patient had been started on transition from Lamictal to Oxcarb, however due to side effects, was subsequently stopped. Patient's last seizure was 7/2019, classified as small seizure (head turning to the right, clenched right hand, staring spells, patient post-ictal for 20 mins, however did not require Diastat for seizure rescue). ROS otherwise unremarkable for any generalized, pain, LOC, SOB, N/V, bowel/bladder loss, new focal weakness. Patient is wheel chair-bound. Patient also needs refills.  \par ________\par 9/17/2018- Patient presents today for follow up. He did not make it to his May appointment because of transportation issues. Had an EMU admission where no seizures were recorded. His father states that he had a seizure in August and was hospitalized for 4 days where they increased his Keppra dose to 1500 BID. 3 weeks ago he had another seizure which his father described as eyes deviated to the left along with his head and stiffening of his extremities. After the first 5 minutes, he was given diastat. The seizure continued for another 10 minutes after that with a total of 15 minutes. He was then confused and was back to his baseline the following morning. \par \par 11/13/2017- Mother reports that patient has twitching episodes once every 6-8 months. Last episode was about 4 months ago where he had facial twitching for a few minutes and was given diastat, no seizures after that. They would like an EMU admission to see if he is having subclinical seizures. No adverse effects from the medication regimen. Continues to take Keppra 1000/1500mg and Lamictal 200mg BID.\par \par 5/8/17:\par Since patient's last appointment in 11/2016, he has not had any further blinking episodes, staring and facial twitching.  Father reports that he did not schedule EMU admission until he and wife acquired guardianship in order to stay with patient in hospital, but now that they acquired it, patient no longer having the episodes.  Patient continues to take Keppra 1000/1500mg and Lamictal 200mg BID, tolerating well, no adverse effects. No seizures since last appointment. \par \par PMH:\par Pt is a 19 yo M with hx of Fragile X syndrome, MR and seizures since 3 years old. He has been on Keppra and Lamictal since about 8 years ago with great control of the seizures currently about once a year. He is currently on (generic medications) Keppra 1000 mg tab 2 po AM and 1.5 in the PM and Lamictal 200 mg tab, 1 po BID. The seizures typically consist of eyes rolled up and had shaking with generalized body shaking and stiffening. The events typically last about 1 minute and they give him rectal Diastat which stops the seizures promptly.

## 2020-02-06 NOTE — PHYSICAL EXAM
[General Appearance - Alert] : alert [General Appearance - Well Developed] : well developed [General Appearance - In No Acute Distress] : in no acute distress [General Appearance - Well Nourished] : well nourished [General Appearance - Well-Appearing] : healthy appearing [PERRL With Normal Accommodation] : pupils were equal in size, round, reactive to light, with normal accommodation [Sclera] : the sclera and conjunctiva were normal [Extraocular Movements] : extraocular movements were intact [Neck Appearance] : the appearance of the neck was normal [Exaggerated Use Of Accessory Muscles For Inspiration] : no accessory muscle use [] : no respiratory distress [Skin Color & Pigmentation] : normal skin color and pigmentation [FreeTextEntry1] : General: Sitting in wheelchair in no acute distress\par Neuro: MS: Awake and alert, non-verbal, not following simple commands\par CN: PERRL, EOMI, no 7th nerve palsy, tongue midline\par Motor: Increased tone throughout. b/l UE's antigravity, internally rotated/contracted. minimal movements of the b/l LE\par Reflexes: 3+ DTR's throughout\par Gait: WC bound

## 2020-02-08 LAB
LAMOTRIGINE SERPL-MCNC: 15 MCG/ML — SIGNIFICANT CHANGE UP (ref 4–18)
LEVETIRACETAM SERPL-MCNC: 54.6 MCG/ML — HIGH (ref 12–46)

## 2020-02-10 ENCOUNTER — RX RENEWAL (OUTPATIENT)
Age: 25
End: 2020-02-10

## 2020-03-04 RX ORDER — LAMOTRIGINE 250 MG/1
250 TABLET, EXTENDED RELEASE ORAL
Qty: 60 | Refills: 3 | Status: ACTIVE | COMMUNITY
Start: 2019-08-29 | End: 1900-01-01

## 2020-03-05 RX ORDER — LAMOTRIGINE 200 MG/1
200 TABLET ORAL
Qty: 60 | Refills: 4 | Status: ACTIVE | COMMUNITY
Start: 2019-09-30 | End: 1900-01-01

## 2020-04-16 ENCOUNTER — RX CHANGE (OUTPATIENT)
Age: 25
End: 2020-04-16

## 2020-04-30 NOTE — HISTORY OF PRESENT ILLNESS
[FreeTextEntry1] : 4/28/2020 Phone call follow up: Spoke with Mustapha's father who reported that Mustapha is doing very well. Has not had a "big" seizure in a long time but had 2 "small" seizures where he stares off into space and eyes deviate to one side and pupils dilate. He was given diastat both times and seizures resolved. The episodes occur at night. Father also reports that when his son was switched to Lamictal 250 BID ER, he did not tolerate it well and father put him back on 200 mg BID and was wondering if 250 BID not extended release can be prescribed. \par \par f/u 2/6/20: 25 yo M with Fragile X syndrome, MR, and epilepsy, currently on Keppra 1500 BID ER and Lamictal ER 200BID, Diastat PRN. Requested Labs CBC, CMP, Lamictal, Keppra not completed during the interval, do be done today. Patient had 2 seizures (11/2019, 2/2020) during the interval, both resolved after giving Diastat PRN (5 minutes). Post-ictal period lasting 20-30 minutes (confusion). ROS otherwise unremarkable for any generalized, pain, LOC, SOB, N/V, bowel/bladder loss, new focal weakness\par __________\par F/u 8/29/19: 22 yo man who presents to seizure clinic since September 2018 for hx of epilepsy in setting of Fragile X Syndrome w/ intellectual disability. Patient's current AED regimen includes Keppra 1500mg BID and Lamictal 200mg BID. Patient had been started on transition from Lamictal to Oxcarb, however due to side effects, was subsequently stopped. Patient's last seizure was 7/2019, classified as small seizure (head turning to the right, clenched right hand, staring spells, patient post-ictal for 20 mins, however did not require Diastat for seizure rescue). ROS otherwise unremarkable for any generalized, pain, LOC, SOB, N/V, bowel/bladder loss, new focal weakness. Patient is wheel chair-bound. Patient also needs refills.  \par ________\par 9/17/2018- Patient presents today for follow up. He did not make it to his May appointment because of transportation issues. Had an EMU admission where no seizures were recorded. His father states that he had a seizure in August and was hospitalized for 4 days where they increased his Keppra dose to 1500 BID. 3 weeks ago he had another seizure which his father described as eyes deviated to the left along with his head and stiffening of his extremities. After the first 5 minutes, he was given diastat. The seizure continued for another 10 minutes after that with a total of 15 minutes. He was then confused and was back to his baseline the following morning. \par \par 11/13/2017- Mother reports that patient has twitching episodes once every 6-8 months. Last episode was about 4 months ago where he had facial twitching for a few minutes and was given diastat, no seizures after that. They would like an EMU admission to see if he is having subclinical seizures. No adverse effects from the medication regimen. Continues to take Keppra 1000/1500mg and Lamictal 200mg BID.\par \par 5/8/17:\par Since patient's last appointment in 11/2016, he has not had any further blinking episodes, staring and facial twitching.  Father reports that he did not schedule EMU admission until he and wife acquired guardianship in order to stay with patient in hospital, but now that they acquired it, patient no longer having the episodes.  Patient continues to take Keppra 1000/1500mg and Lamictal 200mg BID, tolerating well, no adverse effects. No seizures since last appointment. \par \par PMH:\par Pt is a 19 yo M with hx of Fragile X syndrome, MR and seizures since 3 years old. He has been on Keppra and Lamictal since about 8 years ago with great control of the seizures currently about once a year. He is currently on (generic medications) Keppra 1000 mg tab 2 po AM and 1.5 in the PM and Lamictal 200 mg tab, 1 po BID. The seizures typically consist of eyes rolled up and had shaking with generalized body shaking and stiffening. The events typically last about 1 minute and they give him rectal Diastat which stops the seizures promptly.

## 2020-04-30 NOTE — DISCUSSION/SUMMARY
[FreeTextEntry1] : 25 yo M with Fragile X syndrome, MR, and epilepsy, currently on Keppra ER 1500 BID and Lamictal ER 200BID. Had 2 seizures since last visit  which consisted of staring, eyes deviating to one side, and pupil dilation. Last Hospitalization in Spring 2019 ([facility], breakthrough seizure 2/2 bladder infection). Patient did not tolerate Lamictal 250 BID ER and went back to 200 BID. Labs done in Feb reviewed. \par \par Discussed with father that the appointment for May 7th will be moved to a later date, father showed understanding. \par \par Plan:\par -c/w Keppra 1500 BID Extended Release, \par - Will prescribe Lamictal 250mg BID, not extended release\par -Diastat for seizure rescue PRN for prolonged seizures greater than 3 minutes\par -Appointment to be made in June/July\par -please contact clinic in case patient is hospitalized. \par \par Plan discussed w/ attending.

## 2020-06-26 DIAGNOSIS — G40.909 EPILEPSY, UNSPECIFIED, NOT INTRACTABLE, W/OUT STATUS EPILEPTICUS: ICD-10-CM

## 2020-06-26 RX ORDER — LEVETIRACETAM 750 MG/1
750 TABLET, EXTENDED RELEASE ORAL
Qty: 120 | Refills: 5 | Status: ACTIVE | COMMUNITY
Start: 2019-08-29 | End: 1900-01-01

## 2020-06-26 RX ORDER — LAMOTRIGINE 150 MG/1
150 TABLET ORAL
Qty: 60 | Refills: 3 | Status: ACTIVE | COMMUNITY
Start: 2020-04-28 | End: 1900-01-01

## 2020-06-26 RX ORDER — LAMOTRIGINE 100 MG/1
100 TABLET ORAL TWICE DAILY
Qty: 60 | Refills: 3 | Status: ACTIVE | COMMUNITY
Start: 2020-04-28 | End: 1900-01-01

## 2020-06-26 NOTE — DISCUSSION/SUMMARY
[FreeTextEntry1] : 23 yo M with Fragile X syndrome, MR, and epilepsy, currently on Keppra ER 1500 BID and Lamictal 200BID. Had 2 seizures since last visit  which consisted of staring, eyes deviating to one side, and pupil dilation. Last Hospitalization in Spring 2019 ([facility], breakthrough seizure 2/2 bladder infection). Labs done in Feb reviewed. \par \par Discussed with parent that the appointment for July 2nd will be moved to a later date, they showed understanding. Refills send to pharmacy.\par \par Plan:\par -c/w Keppra 1500 BID Extended Release, \par -C/w Lamictal 250mg BID\par -Diastat for seizure rescue PRN for prolonged seizures greater than 3 minutes\par -Appointment to be made for later date\par -please contact clinic in case patient is hospitalized. \par \par Plan discussed w/ attending.

## 2020-06-26 NOTE — HISTORY OF PRESENT ILLNESS
[FreeTextEntry1] : 6/26/2020 Phone call follow up: Spoke with Lisa mccall who reported that Mustapha is doing well and has had 2 seizures since last follow up, one required diastat. They are happy with his seizure control and would like to follow the same regimen. \par \par 4/28/2020 Phone call follow up: Spoke with Mustapha's father who reported that Mustapha is doing very well. Has not had a "big" seizure in a long time but had 2 "small" seizures where he stares off into space and eyes deviate to one side and pupils dilate. He was given diastat both times and seizures resolved. The episodes occur at night. Father also reports that when his son was switched to Lamictal 250 BID ER, he did not tolerate it well and father put him back on 200 mg BID and was wondering if 250 BID not extended release can be prescribed. \par \par f/u 2/6/20: 23 yo M with Fragile X syndrome, MR, and epilepsy, currently on Keppra 1500 BID ER and Lamictal ER 200BID, Diastat PRN. Requested Labs CBC, CMP, Lamictal, Keppra not completed during the interval, do be done today. Patient had 2 seizures (11/2019, 2/2020) during the interval, both resolved after giving Diastat PRN (5 minutes). Post-ictal period lasting 20-30 minutes (confusion). ROS otherwise unremarkable for any generalized, pain, LOC, SOB, N/V, bowel/bladder loss, new focal weakness\par __________\par F/u 8/29/19: 22 yo man who presents to seizure clinic since September 2018 for hx of epilepsy in setting of Fragile X Syndrome w/ intellectual disability. Patient's current AED regimen includes Keppra 1500mg BID and Lamictal 200mg BID. Patient had been started on transition from Lamictal to Oxcarb, however due to side effects, was subsequently stopped. Patient's last seizure was 7/2019, classified as small seizure (head turning to the right, clenched right hand, staring spells, patient post-ictal for 20 mins, however did not require Diastat for seizure rescue). ROS otherwise unremarkable for any generalized, pain, LOC, SOB, N/V, bowel/bladder loss, new focal weakness. Patient is wheel chair-bound. Patient also needs refills.  \par ________\par 9/17/2018- Patient presents today for follow up. He did not make it to his May appointment because of transportation issues. Had an EMU admission where no seizures were recorded. His father states that he had a seizure in August and was hospitalized for 4 days where they increased his Keppra dose to 1500 BID. 3 weeks ago he had another seizure which his father described as eyes deviated to the left along with his head and stiffening of his extremities. After the first 5 minutes, he was given diastat. The seizure continued for another 10 minutes after that with a total of 15 minutes. He was then confused and was back to his baseline the following morning. \par \par 11/13/2017- Mother reports that patient has twitching episodes once every 6-8 months. Last episode was about 4 months ago where he had facial twitching for a few minutes and was given diastat, no seizures after that. They would like an EMU admission to see if he is having subclinical seizures. No adverse effects from the medication regimen. Continues to take Keppra 1000/1500mg and Lamictal 200mg BID.\par \par 5/8/17:\par Since patient's last appointment in 11/2016, he has not had any further blinking episodes, staring and facial twitching.  Father reports that he did not schedule EMU admission until he and wife acquired guardianship in order to stay with patient in hospital, but now that they acquired it, patient no longer having the episodes.  Patient continues to take Keppra 1000/1500mg and Lamictal 200mg BID, tolerating well, no adverse effects. No seizures since last appointment. \par \par PMH:\par Pt is a 21 yo M with hx of Fragile X syndrome, MR and seizures since 3 years old. He has been on Keppra and Lamictal since about 8 years ago with great control of the seizures currently about once a year. He is currently on (generic medications) Keppra 1000 mg tab 2 po AM and 1.5 in the PM and Lamictal 200 mg tab, 1 po BID. The seizures typically consist of eyes rolled up and had shaking with generalized body shaking and stiffening. The events typically last about 1 minute and they give him rectal Diastat which stops the seizures promptly.

## 2020-09-17 ENCOUNTER — APPOINTMENT (OUTPATIENT)
Dept: NEUROLOGY | Facility: HOSPITAL | Age: 25
End: 2020-09-17

## 2020-09-20 ENCOUNTER — RX RENEWAL (OUTPATIENT)
Age: 25
End: 2020-09-20

## 2020-09-20 RX ORDER — LEVETIRACETAM 750 MG/1
750 TABLET, FILM COATED ORAL
Qty: 120 | Refills: 5 | Status: ACTIVE | COMMUNITY
Start: 2018-09-17 | End: 1900-01-01

## 2021-01-22 NOTE — PATIENT PROFILE ADULT. - MEDICATION, ABILITY TO FOLLOW SCHEDULE, PROFILE
Quality 130: Documentation Of Current Medications In The Medical Record: Current Medications Documented Detail Level: Detailed Quality 431: Preventive Care And Screening: Unhealthy Alcohol Use - Screening: Patient screened for unhealthy alcohol use using a single question and scores less than 2 times per year Quality 110: Preventive Care And Screening: Influenza Immunization: Influenza Immunization previously received during influenza season Quality 226: Preventive Care And Screening: Tobacco Use: Screening And Cessation Intervention: Patient screened for tobacco use and is an ex/non-smoker cerebral palsey

## 2022-11-18 NOTE — EEG REPORT - NS EEG TEXT BOX
[facility]  Report of Routine EEG with Video    [facility]: [address] [place] [address], Phone: [phone]  [facility]: [address] [place] [address], [place] [address], Phone: [phone]  Office: [address] [facility], [facility] [address], [place] [address], Phone: [phone]    Patient Name: IRINA JACOBSEN  Age: 22 y  : 1995  Patient ID: -, MRN #: -, Location: [id number]  Referring Physician: WALKER GUO    EEG #: [id number]  Study Date: 2018		    Technical Information:					  On Instrument: -  Placement and Labeling of Electrodes:  The EEG was performed utilizing 20 channels referential EEG connections (coronal over temporal over parasagittal montage) using all standard 10-20 electrode placements with EKG.  Recording was at a sampling rate of 256 samples per second per channel.  Time synchronized digital video recording was done simultaneously with EEG recording.  A low light infrared camera was used for low light recording.  Stephen and seizure detection algorithms were utilized.    History:  THIS IS A ROUTINE EEG  21 YO MALE  607-C  HX-CEREBRAL PALSY  NON-VERBAL  CONCERN FOR SEIZURE    Medication	  No Data.	    Study Interpretation:    FINDINGS:  The background was continuous, spontaneously variable and reactive. A posterior dominant rhythm is not seen.     Background Slowing:  There is diffuse theta slowing.    Focal Slowing:   None was present.    Sleep Background:  Drowsiness and stage II sleep transients were not recorded.    Other Non-Epileptiform Findings:  None were present.    Interictal Epileptiform Activity:   None were present.    Events:  Clinical events: None recorded.  Seizures: None recorded.    Activation Procedures:   Hyperventilation was not performed.    Photic stimulation was not performed.    Artifacts:  Intermittent myogenic and movement artifacts were noted.    ECG:  The heart rate on single channel ECG was predominantly between 60-70 BPM.    EEG Summary/Classification:  Abnormal EEG study in the awake states.  1. background slowing, generalized      EEG Impression/Clinical Correlate:  Abnormal EEG.  Moderate nonspecific diffuse or multifocal cerebral dysfunction.   No epileptiform pattern, events, or seizures recorded.       ________________________________________    Serafin Trevizo MD  Attending Physician, [facility]
[FreeTextEntry1] : pt with significant family h/o, HTN, HLD use to be seen by dr. alexandra has not seen in 4 months, SPINAL STENOSIS AND NEUROPATHY with numbness to ankles. \par \par pt here as was concerned more tired doing activities over the last couple months and was concerned. pt had hip problems and  stopped using treadmill 6 months ago. pt also has a chest tightness at rest lasting minutes with no sob or n/v. \par pt had signficant family h/o \par \par

## 2023-11-20 NOTE — PATIENT PROFILE ADULT. - VISION (WITH CORRECTIVE LENSES IF THE PATIENT USUALLY WEARS THEM):
Rx for terazole 7 sent to pharmacy. Call in 1 week if sx not fully resolved. Normal vision: sees adequately in most situations; can see medication labels, newsprint

## 2025-06-26 NOTE — DISCHARGE NOTE ADULT - CASE MANAGER'S NAME
Caller: Nancy Hernandez    Relationship to patient: Mother    Best call back number: [phone]     Chief complaint: ITCHY RED RASH ON FACE    Patient directed to call 911 or go to their nearest emergency room.     Patient verbalized understanding: [x] Yes  [] No  If no, why?   Tata Maciel  RN Case Manager